# Patient Record
Sex: MALE | Race: WHITE | NOT HISPANIC OR LATINO | Employment: FULL TIME | URBAN - METROPOLITAN AREA
[De-identification: names, ages, dates, MRNs, and addresses within clinical notes are randomized per-mention and may not be internally consistent; named-entity substitution may affect disease eponyms.]

---

## 2020-05-09 ENCOUNTER — APPOINTMENT (OUTPATIENT)
Dept: GENERAL RADIOLOGY | Facility: HOSPITAL | Age: 45
End: 2020-05-09

## 2020-05-09 ENCOUNTER — HOSPITAL ENCOUNTER (EMERGENCY)
Facility: HOSPITAL | Age: 45
Discharge: HOME | End: 2020-05-10
Attending: EMERGENCY MEDICINE | Admitting: EMERGENCY MEDICINE

## 2020-05-09 DIAGNOSIS — I10 HYPERTENSION, UNSPECIFIED TYPE: Primary | ICD-10-CM

## 2020-05-09 PROCEDURE — 96374 THER/PROPH/DIAG INJ IV PUSH: CPT

## 2020-05-09 PROCEDURE — 84443 ASSAY THYROID STIM HORMONE: CPT | Performed by: EMERGENCY MEDICINE

## 2020-05-09 PROCEDURE — 80053 COMPREHEN METABOLIC PANEL: CPT | Performed by: EMERGENCY MEDICINE

## 2020-05-09 PROCEDURE — 93005 ELECTROCARDIOGRAM TRACING: CPT | Performed by: EMERGENCY MEDICINE

## 2020-05-09 PROCEDURE — 85025 COMPLETE CBC W/AUTO DIFF WBC: CPT | Performed by: EMERGENCY MEDICINE

## 2020-05-09 PROCEDURE — 99284 EMERGENCY DEPT VISIT MOD MDM: CPT

## 2020-05-09 PROCEDURE — 85007 BL SMEAR W/DIFF WBC COUNT: CPT | Performed by: EMERGENCY MEDICINE

## 2020-05-09 PROCEDURE — 71045 X-RAY EXAM CHEST 1 VIEW: CPT

## 2020-05-09 PROCEDURE — 84484 ASSAY OF TROPONIN QUANT: CPT | Performed by: EMERGENCY MEDICINE

## 2020-05-09 RX ORDER — LABETALOL HYDROCHLORIDE 5 MG/ML
20 INJECTION, SOLUTION INTRAVENOUS ONCE
Status: COMPLETED | OUTPATIENT
Start: 2020-05-09 | End: 2020-05-09

## 2020-05-09 RX ORDER — SODIUM CHLORIDE 0.9 % (FLUSH) 0.9 %
10 SYRINGE (ML) INJECTION AS NEEDED
Status: DISCONTINUED | OUTPATIENT
Start: 2020-05-09 | End: 2020-05-10 | Stop reason: HOSPADM

## 2020-05-09 RX ADMIN — LABETALOL 20 MG/4 ML (5 MG/ML) INTRAVENOUS SYRINGE 20 MG: at 23:48

## 2020-05-10 VITALS
HEIGHT: 67 IN | SYSTOLIC BLOOD PRESSURE: 173 MMHG | TEMPERATURE: 97.9 F | OXYGEN SATURATION: 97 % | RESPIRATION RATE: 18 BRPM | DIASTOLIC BLOOD PRESSURE: 93 MMHG | HEART RATE: 82 BPM | BODY MASS INDEX: 33.63 KG/M2 | WEIGHT: 214.29 LBS

## 2020-05-10 LAB
ALBUMIN SERPL-MCNC: 4.6 G/DL (ref 3.5–5.2)
ALBUMIN/GLOB SERPL: 1.5 G/DL
ALP SERPL-CCNC: 88 U/L (ref 39–117)
ALT SERPL W P-5'-P-CCNC: 35 U/L (ref 1–41)
ANION GAP SERPL CALCULATED.3IONS-SCNC: 14 MMOL/L (ref 5–15)
AST SERPL-CCNC: 23 U/L (ref 1–40)
BILIRUB SERPL-MCNC: 0.2 MG/DL (ref 0.2–1.2)
BUN BLD-MCNC: 14 MG/DL (ref 6–20)
BUN/CREAT SERPL: 15.4 (ref 7–25)
CALCIUM SPEC-SCNC: 9.7 MG/DL (ref 8.6–10.5)
CHLORIDE SERPL-SCNC: 102 MMOL/L (ref 98–107)
CO2 SERPL-SCNC: 24 MMOL/L (ref 22–29)
CREAT BLD-MCNC: 0.91 MG/DL (ref 0.76–1.27)
DEPRECATED RDW RBC AUTO: 37.6 FL (ref 37–54)
EOSINOPHIL # BLD MANUAL: 0.06 10*3/MM3 (ref 0–0.4)
EOSINOPHIL NFR BLD MANUAL: 1 % (ref 0.3–6.2)
ERYTHROCYTE [DISTWIDTH] IN BLOOD BY AUTOMATED COUNT: 12.6 % (ref 12.3–15.4)
GFR SERPL CREATININE-BSD FRML MDRD: 90 ML/MIN/1.73
GLOBULIN UR ELPH-MCNC: 3.1 GM/DL
GLUCOSE BLD-MCNC: 110 MG/DL (ref 65–99)
HCT VFR BLD AUTO: 46 % (ref 37.5–51)
HGB BLD-MCNC: 16.4 G/DL (ref 13–17.7)
LYMPHOCYTES # BLD MANUAL: 2.29 10*3/MM3 (ref 0.7–3.1)
LYMPHOCYTES NFR BLD MANUAL: 37 % (ref 19.6–45.3)
LYMPHOCYTES NFR BLD MANUAL: 5 % (ref 5–12)
MCH RBC QN AUTO: 30 PG (ref 26.6–33)
MCHC RBC AUTO-ENTMCNC: 35.6 G/DL (ref 31.5–35.7)
MCV RBC AUTO: 84.1 FL (ref 79–97)
MONOCYTES # BLD AUTO: 0.31 10*3/MM3 (ref 0.1–0.9)
NEUTROPHILS # BLD AUTO: 3.35 10*3/MM3 (ref 1.7–7)
NEUTROPHILS NFR BLD MANUAL: 48 % (ref 42.7–76)
NEUTS BAND NFR BLD MANUAL: 6 % (ref 0–5)
NEUTS VAC BLD QL SMEAR: ABNORMAL
PLAT MORPH BLD: NORMAL
PLATELET # BLD AUTO: 219 10*3/MM3 (ref 140–450)
PMV BLD AUTO: 8.1 FL (ref 6–12)
POTASSIUM BLD-SCNC: 3.5 MMOL/L (ref 3.5–5.2)
PROT SERPL-MCNC: 7.7 G/DL (ref 6–8.5)
RBC # BLD AUTO: 5.47 10*6/MM3 (ref 4.14–5.8)
RBC MORPH BLD: NORMAL
SCAN SLIDE: NORMAL
SODIUM BLD-SCNC: 140 MMOL/L (ref 136–145)
TOXIC GRANULATION: ABNORMAL
TROPONIN T SERPL-MCNC: <0.01 NG/ML (ref 0–0.03)
TSH SERPL DL<=0.05 MIU/L-ACNC: 2 UIU/ML (ref 0.27–4.2)
VARIANT LYMPHS NFR BLD MANUAL: 3 % (ref 0–5)
WBC NRBC COR # BLD: 6.2 10*3/MM3 (ref 3.4–10.8)

## 2020-05-10 RX ORDER — LABETALOL HYDROCHLORIDE 5 MG/ML
20 INJECTION, SOLUTION INTRAVENOUS ONCE
Status: DISCONTINUED | OUTPATIENT
Start: 2020-05-10 | End: 2020-05-10 | Stop reason: HOSPADM

## 2020-05-10 RX ORDER — LISINOPRIL 20 MG/1
40 TABLET ORAL DAILY
Qty: 30 TABLET | Refills: 0 | Status: SHIPPED | OUTPATIENT
Start: 2020-05-10 | End: 2022-09-21 | Stop reason: SDUPTHER

## 2020-05-10 NOTE — DISCHARGE INSTRUCTIONS
Follow-up with your family doctor on Tuesday as scheduled.  Take lisinopril 40 mg daily instead of 20.  Monitor blood pressure closely.  Return for chest pain shortness of breath severe headache visual change speech difficulty unable to talk walk or any other strokelike symptoms.  Return for tongue or throat swelling shortness of breath or any other new or worse problems or concerns.

## 2020-05-10 NOTE — ED PROVIDER NOTES
Subjective   Chief complaint high blood pressure    History of present illness 45-year-old male who comes to the hospital today because of elevated blood pressure he states he was diagnosed 2 weeks ago and started on lisinopril 20 mg but he states he continues to have elevated blood pressure denied it was running about 220/120 in that range.  He denies any complaints there is no headache chest pain or shortness of breath no visual change speech difficulty or paralysis states he gets occasionally lightheaded but otherwise he has no complaints.  Nothing seems to make it better or worse he states this is been ongoing for the last couple weeks that he is aware of when he was started on medications.  No recent illness flus or viruses or ill exposures or foreign travels and no injuries.  No urinary problems.          Review of Systems   Constitutional: Negative for chills and fever.   HENT: Negative for congestion and sinus pressure.    Eyes: Negative for photophobia and visual disturbance.   Respiratory: Negative for chest tightness and shortness of breath.    Cardiovascular: Negative for chest pain and leg swelling.   Gastrointestinal: Negative for abdominal pain and vomiting.   Endocrine: Negative for cold intolerance and heat intolerance.   Genitourinary: Negative for difficulty urinating and dysuria.   Musculoskeletal: Negative for arthralgias and back pain.   Skin: Negative for color change and pallor.   Neurological: Positive for light-headedness. Negative for dizziness, facial asymmetry, speech difficulty and headaches.   Psychiatric/Behavioral: Negative for agitation and behavioral problems.       History reviewed. No pertinent past medical history.  Hypertension  No Known Allergies    History reviewed. No pertinent surgical history.    No family history on file.    Social History     Socioeconomic History   • Marital status: Single     Spouse name: Not on file   • Number of children: Not on file   • Years of  education: Not on file   • Highest education level: Not on file       Lisinopril    Objective   Physical Exam  45-year-old male awake alert no acute distress.  HEENT extraocular muscles intact pupils equal reactive there is no nystagmus and disc are sharp without photophobia mouth is clear neck is supple no adenopathy no JVD no bruits lungs clear no retractions heart regular without murmur abdomen soft without tenderness no pulsatile masses extremities pulses are equal throughout upper and lower extremities no edema cords or Homans sign no evidence of DVT.  Patient's awake alert orientated x4 no facial asymmetry normal speech no drift in arms or legs normal finger-to-nose without focal weakness  Procedures           ED Course      Results for orders placed or performed during the hospital encounter of 05/09/20   Comprehensive Metabolic Panel   Result Value Ref Range    Glucose 110 (H) 65 - 99 mg/dL    BUN 14 6 - 20 mg/dL    Creatinine 0.91 0.76 - 1.27 mg/dL    Sodium 140 136 - 145 mmol/L    Potassium 3.5 3.5 - 5.2 mmol/L    Chloride 102 98 - 107 mmol/L    CO2 24.0 22.0 - 29.0 mmol/L    Calcium 9.7 8.6 - 10.5 mg/dL    Total Protein 7.7 6.0 - 8.5 g/dL    Albumin 4.60 3.50 - 5.20 g/dL    ALT (SGPT) 35 1 - 41 U/L    AST (SGOT) 23 1 - 40 U/L    Alkaline Phosphatase 88 39 - 117 U/L    Total Bilirubin 0.2 0.2 - 1.2 mg/dL    eGFR Non African Amer 90 >60 mL/min/1.73    Globulin 3.1 gm/dL    A/G Ratio 1.5 g/dL    BUN/Creatinine Ratio 15.4 7.0 - 25.0    Anion Gap 14.0 5.0 - 15.0 mmol/L   Troponin   Result Value Ref Range    Troponin T <0.010 0.000 - 0.030 ng/mL   TSH   Result Value Ref Range    TSH 2.000 0.270 - 4.200 uIU/mL   CBC Auto Differential   Result Value Ref Range    WBC 6.20 3.40 - 10.80 10*3/mm3    RBC 5.47 4.14 - 5.80 10*6/mm3    Hemoglobin 16.4 13.0 - 17.7 g/dL    Hematocrit 46.0 37.5 - 51.0 %    MCV 84.1 79.0 - 97.0 fL    MCH 30.0 26.6 - 33.0 pg    MCHC 35.6 31.5 - 35.7 g/dL    RDW 12.6 12.3 - 15.4 %    RDW-SD  37.6 37.0 - 54.0 fl    MPV 8.1 6.0 - 12.0 fL    Platelets 219 140 - 450 10*3/mm3   Scan Slide   Result Value Ref Range    Scan Slide     Manual Differential   Result Value Ref Range    Neutrophil % 48.0 42.7 - 76.0 %    Lymphocyte % 37.0 19.6 - 45.3 %    Monocyte % 5.0 5.0 - 12.0 %    Eosinophil % 1.0 0.3 - 6.2 %    Bands %  6.0 (H) 0.0 - 5.0 %    Atypical Lymphocyte % 3.0 0.0 - 5.0 %    Neutrophils Absolute 3.35 1.70 - 7.00 10*3/mm3    Lymphocytes Absolute 2.29 0.70 - 3.10 10*3/mm3    Monocytes Absolute 0.31 0.10 - 0.90 10*3/mm3    Eosinophils Absolute 0.06 0.00 - 0.40 10*3/mm3    RBC Morphology Normal Normal    Toxic Granulation Slight/1+ None Seen    Vacuolated Neutrophils Slight/1+ None Seen    Platelet Morphology Normal Normal     No radiology results for the last day  Medications   sodium chloride 0.9 % flush 10 mL (has no administration in time range)   labetalol (NORMODYNE,TRANDATE) injection 20 mg (has no administration in time range)   labetalol (NORMODYNE,TRANDATE) injection 20 mg (20 mg Intravenous Given 5/9/20 2348)          EKG my interpretation normal sinus rhythm rate of 96 some nonspecific repolarization abnormalities QTC of 425 no acute ST elevation nothing old to compare with abnormal EKG                                  MDM  Number of Diagnoses or Management Options  Hypertension, unspecified type:   Diagnosis management comments: Medical decision making.  Patient IV established placed on the monitor and had the above exam evaluation blood pressure was elevated about 212/118 he was given labetalol 20 mg IV CBC electrolytes troponin unremarkable.  TSH within normal limits.  Chest x-ray showed no acute findings.  Patient's blood pressure now is been 180/103 heart rate in the 80s he was given labetalol 20 mg IV.  Blood pressure did improve somewhat we did talk about this.  He is now on 20 mg lisinopril we will increase that but we made him aware of potential side effects including angioedema cough  renal abnormalities and he will need further blood work and follow-up on this he has an appointment on Tuesday.  We will monitor his blood pressure closely he has a machine at home.  I see no evidence of acute endorgan damage.  No evidence of a stroke or heart attack no evidence of acute ischemia.  He was made aware of the findings we talked about those findings and what to return for he will be discharged home for outpatient management      Final diagnoses:   Hypertension, unspecified type            Vishnu Armenta MD  05/10/20 0059

## 2020-05-29 ENCOUNTER — OFFICE VISIT (OUTPATIENT)
Dept: CARDIOLOGY | Facility: CLINIC | Age: 45
End: 2020-05-29

## 2020-05-29 VITALS
DIASTOLIC BLOOD PRESSURE: 95 MMHG | HEART RATE: 85 BPM | OXYGEN SATURATION: 97 % | HEIGHT: 67 IN | WEIGHT: 209 LBS | SYSTOLIC BLOOD PRESSURE: 182 MMHG | BODY MASS INDEX: 32.8 KG/M2

## 2020-05-29 DIAGNOSIS — R06.02 SHORTNESS OF BREATH: ICD-10-CM

## 2020-05-29 DIAGNOSIS — R94.31 ABNORMAL EKG: ICD-10-CM

## 2020-05-29 DIAGNOSIS — I10 ESSENTIAL HYPERTENSION: Primary | ICD-10-CM

## 2020-05-29 PROCEDURE — 99203 OFFICE O/P NEW LOW 30 MIN: CPT | Performed by: INTERNAL MEDICINE

## 2020-05-29 RX ORDER — AMLODIPINE BESYLATE 5 MG/1
5 TABLET ORAL DAILY
Qty: 90 TABLET | Refills: 3 | Status: SHIPPED | OUTPATIENT
Start: 2020-05-29

## 2020-05-29 NOTE — PROGRESS NOTES
"    Subjective:     Encounter Date:05/29/2020      Patient ID: Robby Lopez is a 45 y.o. male.    Chief Complaint:  History of Present Illness 42-year-old white male with history of hypertension hyperlipidemia and strong family history of coronary disease presents to my office for a new consultation.  Patient has been having some shortness of breath and dizziness.  No complaints of any chest pains.  No PND orthopnea.  No palpitations.  No syncope or swelling of the feet.  He noticed that he had very high blood pressure.  He started taking medicines but his blood pressure still high and he still having symptoms.  He stopped smoking recently.  He does not follow a good diet but he is unable to exercise very well.  BP (!) 182/95 (BP Location: Left arm, Patient Position: Sitting)   Pulse 85   Ht 170.2 cm (67\")   Wt 94.8 kg (209 lb)   SpO2 97%   BMI 32.73 kg/m²     The following portions of the patient's history were reviewed and updated as appropriate: allergies, current medications, past family history, past medical history, past social history, past surgical history and problem list.  History reviewed. No pertinent past medical history.  History reviewed. No pertinent surgical history.  Social History     Socioeconomic History   • Marital status: Single     Spouse name: Not on file   • Number of children: Not on file   • Years of education: Not on file   • Highest education level: Not on file   Tobacco Use   • Smoking status: Former Smoker   • Smokeless tobacco: Never Used   Substance and Sexual Activity   • Alcohol use: Not Currently     Family History   Problem Relation Age of Onset   • Heart attack Father    • Stroke Maternal Grandmother    • Heart attack Maternal Grandfather        Current Outpatient Medications:   •  lisinopril (PRINIVIL,ZESTRIL) 20 MG tablet, Take 2 tablets by mouth Daily., Disp: 30 tablet, Rfl: 0  •  Loratadine (CLARITIN PO), Take  by mouth., Disp: , Rfl:   No Known Allergies    Review of " Systems   Constitution: Negative for fever and malaise/fatigue.   HENT: Negative for ear pain and nosebleeds.    Eyes: Negative for blurred vision and double vision.   Cardiovascular: Negative for chest pain, dyspnea on exertion and palpitations.   Respiratory: Positive for shortness of breath. Negative for cough.    Skin: Negative for rash.   Musculoskeletal: Negative for joint pain.   Gastrointestinal: Negative for abdominal pain, nausea and vomiting.   Neurological: Positive for dizziness. Negative for focal weakness and headaches.   Psychiatric/Behavioral: Negative for depression. The patient is not nervous/anxious.    All other systems reviewed and are negative.             Objective:     Physical Exam   Constitutional: He appears well-developed and well-nourished.   HENT:   Head: Normocephalic and atraumatic.   Eyes: Pupils are equal, round, and reactive to light. Conjunctivae and EOM are normal. No scleral icterus.   Neck: Normal range of motion. Neck supple. No JVD present. Carotid bruit is not present.   Cardiovascular: Normal rate, regular rhythm, S1 normal, S2 normal, normal heart sounds and intact distal pulses. PMI is not displaced.   Pulmonary/Chest: Effort normal and breath sounds normal. He has no wheezes. He has no rales.   Abdominal: Soft. Bowel sounds are normal.   Musculoskeletal: Normal range of motion.   Neurological: He is alert. He has normal strength.   No focal deficits   Skin: Skin is warm and dry. No rash noted.   Psychiatric: He has a normal mood and affect.       Procedures    Lab Review:       Assessment:          Diagnosis Plan   1. Essential hypertension     2. Abnormal EKG     3. Shortness of breath            Plan:     Patient has history of shortness of breath and has an abnormal EKG.  He has risk factors with hypertension hyperlipidemia and family history of coronary disease.  Patient will have an echocardiogram for LV function valve abnormalities  Patient will also have a stress  Myoview study to rule out ischemia.  Further treatment based on stress test results  Patient's blood pressure very high and hence I will start him on amlodipine 5 mg once a day along with his lisinopril.

## 2020-06-18 ENCOUNTER — HOSPITAL ENCOUNTER (OUTPATIENT)
Dept: CARDIOLOGY | Facility: HOSPITAL | Age: 45
Discharge: HOME | End: 2020-06-18

## 2020-06-18 ENCOUNTER — HOSPITAL ENCOUNTER (OUTPATIENT)
Dept: CARDIOLOGY | Facility: HOSPITAL | Age: 45
Discharge: HOME | End: 2020-06-18
Admitting: INTERNAL MEDICINE

## 2020-06-18 VITALS
DIASTOLIC BLOOD PRESSURE: 87 MMHG | BODY MASS INDEX: 32.8 KG/M2 | SYSTOLIC BLOOD PRESSURE: 140 MMHG | HEIGHT: 67 IN | WEIGHT: 209 LBS

## 2020-06-18 DIAGNOSIS — I10 ESSENTIAL HYPERTENSION: ICD-10-CM

## 2020-06-18 DIAGNOSIS — R06.02 SHORTNESS OF BREATH: ICD-10-CM

## 2020-06-18 DIAGNOSIS — R94.31 ABNORMAL EKG: ICD-10-CM

## 2020-06-18 LAB
BH CV ECHO MEAS - ACS: 1.8 CM
BH CV ECHO MEAS - AO MAX PG (FULL): 3.1 MMHG
BH CV ECHO MEAS - AO MAX PG: 8.4 MMHG
BH CV ECHO MEAS - AO MEAN PG (FULL): 2.2 MMHG
BH CV ECHO MEAS - AO MEAN PG: 4.8 MMHG
BH CV ECHO MEAS - AO ROOT AREA: 9.3 CM^2
BH CV ECHO MEAS - AO ROOT DIAM: 3.4 CM
BH CV ECHO MEAS - AO V2 MAX: 144.8 CM/SEC
BH CV ECHO MEAS - AO V2 MEAN: 105.8 CM/SEC
BH CV ECHO MEAS - AO V2 VTI: 29.8 CM
BH CV ECHO MEAS - ASC AORTA: 3 CM
BH CV ECHO MEAS - AVA(I,A): 2 CM^2
BH CV ECHO MEAS - AVA(I,D): 2 CM^2
BH CV ECHO MEAS - AVA(V,A): 2.1 CM^2
BH CV ECHO MEAS - AVA(V,D): 2.1 CM^2
BH CV ECHO MEAS - EDV(CUBED): 149.1 ML
BH CV ECHO MEAS - EDV(MOD-SP2): 91.1 ML
BH CV ECHO MEAS - EDV(MOD-SP4): 100.2 ML
BH CV ECHO MEAS - EDV(TEICH): 135.5 ML
BH CV ECHO MEAS - EF(CUBED): 61.1 %
BH CV ECHO MEAS - EF(MOD-SP2): 64.9 %
BH CV ECHO MEAS - EF(MOD-SP4): 61 %
BH CV ECHO MEAS - EF(TEICH): 52.2 %
BH CV ECHO MEAS - ESV(CUBED): 58 ML
BH CV ECHO MEAS - ESV(MOD-SP2): 32 ML
BH CV ECHO MEAS - ESV(MOD-SP4): 39.1 ML
BH CV ECHO MEAS - ESV(TEICH): 64.7 ML
BH CV ECHO MEAS - FS: 27 %
BH CV ECHO MEAS - IVS/LVPW: 0.86
BH CV ECHO MEAS - IVSD: 0.87 CM
BH CV ECHO MEAS - LA DIMENSION: 3.2 CM
BH CV ECHO MEAS - LA/AO: 0.94
BH CV ECHO MEAS - LV MASS(C)D: 185.1 GRAMS
BH CV ECHO MEAS - LV MAX PG: 5.3 MMHG
BH CV ECHO MEAS - LV MEAN PG: 2.6 MMHG
BH CV ECHO MEAS - LV V1 MAX: 114.9 CM/SEC
BH CV ECHO MEAS - LV V1 MEAN: 76.5 CM/SEC
BH CV ECHO MEAS - LV V1 VTI: 21.9 CM
BH CV ECHO MEAS - LVIDD: 5.3 CM
BH CV ECHO MEAS - LVIDS: 3.9 CM
BH CV ECHO MEAS - LVOT AREA: 2.7 CM^2
BH CV ECHO MEAS - LVOT DIAM: 1.8 CM
BH CV ECHO MEAS - LVPWD: 1 CM
BH CV ECHO MEAS - MV A MAX VEL: 95.9 CM/SEC
BH CV ECHO MEAS - MV DEC SLOPE: 423.5 CM/SEC^2
BH CV ECHO MEAS - MV DEC TIME: 0.22 SEC
BH CV ECHO MEAS - MV E MAX VEL: 92.9 CM/SEC
BH CV ECHO MEAS - MV E/A: 0.97
BH CV ECHO MEAS - MV MAX PG: 4.4 MMHG
BH CV ECHO MEAS - MV MEAN PG: 2.8 MMHG
BH CV ECHO MEAS - MV V2 MAX: 105.4 CM/SEC
BH CV ECHO MEAS - MV V2 MEAN: 82.1 CM/SEC
BH CV ECHO MEAS - MV V2 VTI: 21.6 CM
BH CV ECHO MEAS - MVA(VTI): 2.7 CM^2
BH CV ECHO MEAS - PA ACC TIME: 0.16 SEC
BH CV ECHO MEAS - PA MAX PG (FULL): 0.41 MMHG
BH CV ECHO MEAS - PA MAX PG: 3.7 MMHG
BH CV ECHO MEAS - PA PR(ACCEL): 7.4 MMHG
BH CV ECHO MEAS - PA V2 MAX: 96.5 CM/SEC
BH CV ECHO MEAS - PULM DIAS VEL: 37.6 CM/SEC
BH CV ECHO MEAS - PULM S/D: 1.4
BH CV ECHO MEAS - PULM SYS VEL: 51.7 CM/SEC
BH CV ECHO MEAS - RV MAX PG: 3.3 MMHG
BH CV ECHO MEAS - RV MEAN PG: 2.2 MMHG
BH CV ECHO MEAS - RV V1 MAX: 91 CM/SEC
BH CV ECHO MEAS - RV V1 MEAN: 71.4 CM/SEC
BH CV ECHO MEAS - RV V1 VTI: 17.8 CM
BH CV ECHO MEAS - RVDD: 2.9 CM
BH CV ECHO MEAS - SV(AO): 276.9 ML
BH CV ECHO MEAS - SV(CUBED): 91.1 ML
BH CV ECHO MEAS - SV(LVOT): 58.7 ML
BH CV ECHO MEAS - SV(MOD-SP2): 59.2 ML
BH CV ECHO MEAS - SV(MOD-SP4): 61.2 ML
BH CV ECHO MEAS - SV(TEICH): 70.7 ML
BH CV STRESS COMMENTS STAGE 1: NORMAL
BH CV STRESS DOSE REGADENOSON STAGE 1: 0.4
BH CV STRESS DURATION MIN STAGE 1: 0
BH CV STRESS DURATION SEC STAGE 1: 10
BH CV STRESS PROTOCOL 1: NORMAL
BH CV STRESS RECOVERY BP: NORMAL MMHG
BH CV STRESS RECOVERY HR: 93 BPM
BH CV STRESS STAGE 1: 1
LV EF NUC BP: 48 %
MAXIMAL PREDICTED HEART RATE: 174 BPM
MAXIMAL PREDICTED HEART RATE: 174 BPM
STRESS BASELINE BP: NORMAL MMHG
STRESS BASELINE HR: 70 BPM
STRESS TARGET HR: 148 BPM
STRESS TARGET HR: 148 BPM

## 2020-06-18 PROCEDURE — 93018 CV STRESS TEST I&R ONLY: CPT | Performed by: INTERNAL MEDICINE

## 2020-06-18 PROCEDURE — 0 TECHNETIUM SESTAMIBI: Performed by: INTERNAL MEDICINE

## 2020-06-18 PROCEDURE — 78452 HT MUSCLE IMAGE SPECT MULT: CPT

## 2020-06-18 PROCEDURE — 93306 TTE W/DOPPLER COMPLETE: CPT

## 2020-06-18 PROCEDURE — A9500 TC99M SESTAMIBI: HCPCS | Performed by: INTERNAL MEDICINE

## 2020-06-18 PROCEDURE — 93017 CV STRESS TEST TRACING ONLY: CPT

## 2020-06-18 PROCEDURE — 93016 CV STRESS TEST SUPVJ ONLY: CPT | Performed by: INTERNAL MEDICINE

## 2020-06-18 PROCEDURE — 93306 TTE W/DOPPLER COMPLETE: CPT | Performed by: INTERNAL MEDICINE

## 2020-06-18 PROCEDURE — 25010000002 REGADENOSON 0.4 MG/5ML SOLUTION: Performed by: INTERNAL MEDICINE

## 2020-06-18 PROCEDURE — 78452 HT MUSCLE IMAGE SPECT MULT: CPT | Performed by: INTERNAL MEDICINE

## 2020-06-18 RX ADMIN — TECHNETIUM TC 99M SESTAMIBI 1 DOSE: 1 INJECTION INTRAVENOUS at 13:00

## 2020-06-18 RX ADMIN — REGADENOSON 0.4 MG: 0.08 INJECTION, SOLUTION INTRAVENOUS at 15:00

## 2022-09-14 ENCOUNTER — APPOINTMENT (OUTPATIENT)
Dept: GENERAL RADIOLOGY | Facility: HOSPITAL | Age: 47
End: 2022-09-14

## 2022-09-14 ENCOUNTER — HOSPITAL ENCOUNTER (EMERGENCY)
Facility: HOSPITAL | Age: 47
Discharge: HOME | End: 2022-09-14
Attending: EMERGENCY MEDICINE | Admitting: EMERGENCY MEDICINE

## 2022-09-14 VITALS
WEIGHT: 231.26 LBS | HEIGHT: 67 IN | OXYGEN SATURATION: 100 % | DIASTOLIC BLOOD PRESSURE: 92 MMHG | RESPIRATION RATE: 24 BRPM | TEMPERATURE: 98 F | BODY MASS INDEX: 36.3 KG/M2 | HEART RATE: 77 BPM | SYSTOLIC BLOOD PRESSURE: 150 MMHG

## 2022-09-14 DIAGNOSIS — R07.9 CHEST PAIN, UNSPECIFIED TYPE: Primary | ICD-10-CM

## 2022-09-14 LAB
ANION GAP SERPL CALCULATED.3IONS-SCNC: 8 MMOL/L (ref 5–15)
BASOPHILS # BLD AUTO: 0.1 10*3/MM3 (ref 0–0.2)
BASOPHILS NFR BLD AUTO: 1.2 % (ref 0–1.5)
BUN SERPL-MCNC: 16 MG/DL (ref 6–20)
BUN/CREAT SERPL: 18.2 (ref 7–25)
CALCIUM SPEC-SCNC: 8.4 MG/DL (ref 8.6–10.5)
CHLORIDE SERPL-SCNC: 104 MMOL/L (ref 98–107)
CO2 SERPL-SCNC: 27 MMOL/L (ref 22–29)
CREAT SERPL-MCNC: 0.88 MG/DL (ref 0.76–1.27)
DEPRECATED RDW RBC AUTO: 41.6 FL (ref 37–54)
EGFRCR SERPLBLD CKD-EPI 2021: 106.1 ML/MIN/1.73
EOSINOPHIL # BLD AUTO: 0.2 10*3/MM3 (ref 0–0.4)
EOSINOPHIL NFR BLD AUTO: 3.1 % (ref 0.3–6.2)
ERYTHROCYTE [DISTWIDTH] IN BLOOD BY AUTOMATED COUNT: 13.4 % (ref 12.3–15.4)
GLUCOSE SERPL-MCNC: 86 MG/DL (ref 65–99)
HCT VFR BLD AUTO: 45.1 % (ref 37.5–51)
HGB BLD-MCNC: 15.3 G/DL (ref 13–17.7)
LYMPHOCYTES # BLD AUTO: 1.6 10*3/MM3 (ref 0.7–3.1)
LYMPHOCYTES NFR BLD AUTO: 22.4 % (ref 19.6–45.3)
MCH RBC QN AUTO: 30 PG (ref 26.6–33)
MCHC RBC AUTO-ENTMCNC: 33.8 G/DL (ref 31.5–35.7)
MCV RBC AUTO: 88.7 FL (ref 79–97)
MONOCYTES # BLD AUTO: 0.7 10*3/MM3 (ref 0.1–0.9)
MONOCYTES NFR BLD AUTO: 9.6 % (ref 5–12)
NEUTROPHILS NFR BLD AUTO: 4.5 10*3/MM3 (ref 1.7–7)
NEUTROPHILS NFR BLD AUTO: 63.7 % (ref 42.7–76)
NRBC BLD AUTO-RTO: 0 /100 WBC (ref 0–0.2)
PLATELET # BLD AUTO: 205 10*3/MM3 (ref 140–450)
PMV BLD AUTO: 9 FL (ref 6–12)
POTASSIUM SERPL-SCNC: 3.4 MMOL/L (ref 3.5–5.2)
RBC # BLD AUTO: 5.09 10*6/MM3 (ref 4.14–5.8)
SODIUM SERPL-SCNC: 139 MMOL/L (ref 136–145)
TROPONIN T SERPL-MCNC: <0.01 NG/ML (ref 0–0.03)
WBC NRBC COR # BLD: 7.1 10*3/MM3 (ref 3.4–10.8)

## 2022-09-14 PROCEDURE — 84484 ASSAY OF TROPONIN QUANT: CPT | Performed by: EMERGENCY MEDICINE

## 2022-09-14 PROCEDURE — 99284 EMERGENCY DEPT VISIT MOD MDM: CPT

## 2022-09-14 PROCEDURE — 71045 X-RAY EXAM CHEST 1 VIEW: CPT

## 2022-09-14 PROCEDURE — 85025 COMPLETE CBC W/AUTO DIFF WBC: CPT | Performed by: EMERGENCY MEDICINE

## 2022-09-14 PROCEDURE — 80048 BASIC METABOLIC PNL TOTAL CA: CPT | Performed by: EMERGENCY MEDICINE

## 2022-09-14 PROCEDURE — 93005 ELECTROCARDIOGRAM TRACING: CPT

## 2022-09-14 RX ORDER — SODIUM CHLORIDE 0.9 % (FLUSH) 0.9 %
10 SYRINGE (ML) INJECTION AS NEEDED
Status: DISCONTINUED | OUTPATIENT
Start: 2022-09-14 | End: 2022-09-14 | Stop reason: HOSPADM

## 2022-09-14 NOTE — DISCHARGE INSTRUCTIONS
Monitor blood pressure closely.  Follow-up with a cardiologist as listed above call tomorrow.  Return for increasing pain neck arm jaw pain shortness of breath dizziness passing out change her mind about staying or any other new or worsening problems or concerns.

## 2022-09-14 NOTE — ED PROVIDER NOTES
Subjective   History of Present Illness  Chief complaint tightness in chest    History of present illness a 47-year-old male who has a history of hypertension who states he went to work about 5:00 this morning he started to have some tightness in his chest and blood pressure was somewhat elevated this is persisted he describes it as mild to moderate there is no neck arm jaw pain dizziness shortness of breath or sweating.  Is been pretty much constant but has improved since getting here.  Blood pressure was slightly elevated as well.  He denies any recent long car ride plane or immobilization or foreign travels antibiotic use or recent hospitalization.  Nothing makes it better or worse it does not get worse with exertion to get better with rest.  No other complaints or associated symptoms at this time        Review of Systems   Constitutional: Negative for chills and fever.   HENT: Negative for congestion and sinus pressure.    Eyes: Negative for photophobia and visual disturbance.   Respiratory: Negative for chest tightness and shortness of breath.    Cardiovascular: Positive for chest pain. Negative for palpitations.   Gastrointestinal: Negative for abdominal pain and vomiting.   Endocrine: Negative for cold intolerance and heat intolerance.   Genitourinary: Negative for difficulty urinating and dysuria.   Musculoskeletal: Negative for back pain and neck pain.   Skin: Negative for color change and rash.   Neurological: Negative for dizziness and light-headedness.   Psychiatric/Behavioral: Negative for agitation and confusion.       No past medical history on file.  Hypertension  No Known Allergies    No past surgical history on file.    Family History   Problem Relation Age of Onset   • Heart attack Father    • Stroke Maternal Grandmother    • Heart attack Maternal Grandfather        Social History     Socioeconomic History   • Marital status: Single   Tobacco Use   • Smoking status: Former Smoker   • Smokeless  tobacco: Never Used   Substance and Sexual Activity   • Alcohol use: Not Currently     Prior to Admission medications    Medication Sig Start Date End Date Taking? Authorizing Provider   amLODIPine (NORVASC) 5 MG tablet Take 1 tablet by mouth Daily. 5/29/20   Jose Verde MD   lisinopril (PRINIVIL,ZESTRIL) 20 MG tablet Take 2 tablets by mouth Daily. 5/10/20   Vishnu Armenta MD   Loratadine (CLARITIN PO) Take  by mouth.    Provider, MD Vishal           Objective   Physical Exam  Constitutional is a 47-year-old male awake alert no distress temperature 98.3 blood pressure 131/85 heart rate 82 respirations 18.  Sats 98% on room air.  HEENT extraocular muscles intact pupils equal round reactive there is no photophobia.  Neck supple no adenopathy no JVD no bruits no meningeal signs.  Lungs clear no retractions.  Heart regular without murmur.  Abdomen soft nontender good bowel sounds no peritoneal findings or pulsatile masses.  Extremities pulses are equal throughout upper and lower extremities no edema cords or Homans' sign no evidence of DVT.  Skin is warm and dry without rashes or cellulitic changes.  Neurologic awake alert follows commands motor strength normal without focal weakness  Procedures           ED Course      Results for orders placed or performed during the hospital encounter of 09/14/22   Basic Metabolic Panel    Specimen: Blood   Result Value Ref Range    Glucose 86 65 - 99 mg/dL    BUN 16 6 - 20 mg/dL    Creatinine 0.88 0.76 - 1.27 mg/dL    Sodium 139 136 - 145 mmol/L    Potassium 3.4 (L) 3.5 - 5.2 mmol/L    Chloride 104 98 - 107 mmol/L    CO2 27.0 22.0 - 29.0 mmol/L    Calcium 8.4 (L) 8.6 - 10.5 mg/dL    BUN/Creatinine Ratio 18.2 7.0 - 25.0    Anion Gap 8.0 5.0 - 15.0 mmol/L    eGFR 106.1 >60.0 mL/min/1.73   Troponin    Specimen: Blood   Result Value Ref Range    Troponin T <0.010 0.000 - 0.030 ng/mL   CBC Auto Differential    Specimen: Blood   Result Value Ref Range    WBC 7.10 3.40 - 10.80  10*3/mm3    RBC 5.09 4.14 - 5.80 10*6/mm3    Hemoglobin 15.3 13.0 - 17.7 g/dL    Hematocrit 45.1 37.5 - 51.0 %    MCV 88.7 79.0 - 97.0 fL    MCH 30.0 26.6 - 33.0 pg    MCHC 33.8 31.5 - 35.7 g/dL    RDW 13.4 12.3 - 15.4 %    RDW-SD 41.6 37.0 - 54.0 fl    MPV 9.0 6.0 - 12.0 fL    Platelets 205 140 - 450 10*3/mm3    Neutrophil % 63.7 42.7 - 76.0 %    Lymphocyte % 22.4 19.6 - 45.3 %    Monocyte % 9.6 5.0 - 12.0 %    Eosinophil % 3.1 0.3 - 6.2 %    Basophil % 1.2 0.0 - 1.5 %    Neutrophils, Absolute 4.50 1.70 - 7.00 10*3/mm3    Lymphocytes, Absolute 1.60 0.70 - 3.10 10*3/mm3    Monocytes, Absolute 0.70 0.10 - 0.90 10*3/mm3    Eosinophils, Absolute 0.20 0.00 - 0.40 10*3/mm3    Basophils, Absolute 0.10 0.00 - 0.20 10*3/mm3    nRBC 0.0 0.0 - 0.2 /100 WBC   ECG 12 Lead   Result Value Ref Range    QT Interval 354 ms     XR Chest 1 View    Result Date: 9/14/2022  IMPRESSION : No acute process.[  Electronically Signed By-Everette Vásquez On:9/14/2022 2:15 PM This report was finalized on [id number] by  Everette Vásquez, .    Medications   sodium chloride 0.9 % flush 10 mL (has no administration in time range)          EKG my interpretation normal sinus rhythm rate of 83 normal axis hypertrophy QTC of 417 normal EKG                                  MDM  Number of Diagnoses or Management Options  Chest pain, unspecified type: new and requires workup  Diagnosis management comments: Medical decision making.  IV established placed on a cardiac monitor showed a sinus rhythm on my review EKG obtained reviewed by me showing normal sinus rhythm without acute findings.  Labs obtained reviewed by me CBC electrolytes troponin within normal limits other than potassium 3.4.  Chest x-ray obtained reviewed by me showed no acute disease as well as radiology.  The patient has no evidence of acute myocardial infarction no evidence of DVT or pulmonary embolism or acute aortic dissection.  I do not see any evidence of an acute event currently  but we did discuss his findings he has a heart score of 2 we discussed observation for stress testing and cardiac work-up review of his records show he had a stress test 2 years ago which is unremarkable.  Patient states he feels fine currently he would rather go home we talked about potential risk and complications he voices understanding.  He is aware of this and he will follow-up with cardiology stable otherwise unremarkable ER course improved       Amount and/or Complexity of Data Reviewed  Clinical lab tests: reviewed  Tests in the radiology section of CPT®: reviewed  Tests in the medicine section of CPT®: reviewed    Risk of Complications, Morbidity, and/or Mortality  Presenting problems: high  Diagnostic procedures: high  Management options: high    Patient Progress  Patient progress: stable      Final diagnoses:   Chest pain, unspecified type       ED Disposition  ED Disposition     ED Disposition   Discharge    Condition   Stable    Comment   --             Gigi Espinal MD  [address] [facility] [place] [address]  [phone]    In 1 day      Jose Verde MD  [address] [facility] [place] [address]  [phone]    In 1 day           Medication List      No changes were made to your prescriptions during this visit.          Vishnu Armenta MD  09/14/22 1630

## 2022-09-20 LAB — QT INTERVAL: 354 MS

## 2022-09-21 ENCOUNTER — OFFICE VISIT (OUTPATIENT)
Dept: CARDIOLOGY | Facility: CLINIC | Age: 47
End: 2022-09-21

## 2022-09-21 VITALS
HEART RATE: 83 BPM | BODY MASS INDEX: 35.31 KG/M2 | WEIGHT: 225 LBS | OXYGEN SATURATION: 97 % | SYSTOLIC BLOOD PRESSURE: 118 MMHG | HEIGHT: 67 IN | DIASTOLIC BLOOD PRESSURE: 73 MMHG

## 2022-09-21 DIAGNOSIS — R07.2 PRECORDIAL PAIN: Primary | ICD-10-CM

## 2022-09-21 DIAGNOSIS — E78.00 PURE HYPERCHOLESTEROLEMIA: ICD-10-CM

## 2022-09-21 DIAGNOSIS — I10 PRIMARY HYPERTENSION: ICD-10-CM

## 2022-09-21 PROCEDURE — 99213 OFFICE O/P EST LOW 20 MIN: CPT | Performed by: INTERNAL MEDICINE

## 2022-09-21 RX ORDER — ROSUVASTATIN CALCIUM 10 MG/1
10 TABLET, COATED ORAL DAILY
COMMUNITY
Start: 2022-09-16

## 2022-09-21 RX ORDER — LISINOPRIL AND HYDROCHLOROTHIAZIDE 25; 20 MG/1; MG/1
1 TABLET ORAL DAILY
COMMUNITY
Start: 2022-08-31

## 2022-09-21 NOTE — PROGRESS NOTES
"    Subjective:     Encounter Date:09/21/2022      Patient ID: Robby Lopez is a 47 y.o. male.    Chief Complaint:  History of Present Illness 7-year-old white male with hypertension hyperlipidemia presents to my office for a follow-up.  Patient had an episode of chest pain when he went to the ER and was ruled out for MI.  He had normal EKG.  He had a stress test and echocardiogram in the last 2 years which were within normal limits.  He is not having any more episodes of chest pain.  Patient chest pain was mostly atypical on the left side without any radiation.  He does not smoke but is quite active.    The following portions of the patient's history were reviewed and updated as appropriate: allergies, current medications, past family history, past medical history, past social history, past surgical history and problem list.  Past Medical History:   Diagnosis Date   • Hyperlipidemia    • Hypertension      Past Surgical History:   Procedure Laterality Date   • EAR TUBES     • FACIAL FRACTURE SURGERY  2019     /73   Pulse 83   Ht 170.2 cm (67\")   Wt 102 kg (225 lb)   SpO2 97%   BMI 35.24 kg/m²   Family History   Problem Relation Age of Onset   • No Known Problems Mother    • Heart attack Father    • No Known Problems Sister    • No Known Problems Brother    • No Known Problems Maternal Aunt    • No Known Problems Maternal Uncle    • No Known Problems Paternal Aunt    • No Known Problems Paternal Uncle    • Stroke Maternal Grandmother    • Heart attack Maternal Grandfather    • No Known Problems Paternal Grandmother    • No Known Problems Paternal Grandfather    • No Known Problems Other    • Anemia Neg Hx    • Arrhythmia Neg Hx    • Asthma Neg Hx    • Clotting disorder Neg Hx    • Fainting Neg Hx    • Heart disease Neg Hx    • Heart failure Neg Hx    • Hyperlipidemia Neg Hx    • Hypertension Neg Hx        Current Outpatient Medications:   •  amLODIPine (NORVASC) 5 MG tablet, Take 1 tablet by mouth Daily., " Disp: 90 tablet, Rfl: 3  •  lisinopril-hydrochlorothiazide (PRINZIDE,ZESTORETIC) 20-25 MG per tablet, Take 1 tablet by mouth Daily., Disp: , Rfl:   •  Loratadine (CLARITIN PO), Take  by mouth., Disp: , Rfl:   •  rosuvastatin (CRESTOR) 10 MG tablet, Take 10 mg by mouth Daily., Disp: , Rfl:   No Known Allergies  Social History     Socioeconomic History   • Marital status: Single   Tobacco Use   • Smoking status: Former Smoker   • Smokeless tobacco: Never Used   Vaping Use   • Vaping Use: Never used   Substance and Sexual Activity   • Alcohol use: Yes     Comment: OCC.   • Drug use: Never   • Sexual activity: Defer     Review of Systems   Constitutional: Negative for fever and malaise/fatigue.   Cardiovascular: Negative for chest pain, dyspnea on exertion and palpitations.   Respiratory: Negative for cough and shortness of breath.    Skin: Negative for rash.   Gastrointestinal: Negative for abdominal pain, nausea and vomiting.   Neurological: Negative for focal weakness and headaches.   All other systems reviewed and are negative.             Objective:     Constitutional:       Appearance: Well-developed.   Eyes:      General: No scleral icterus.     Conjunctiva/sclera: Conjunctivae normal.   HENT:      Head: Normocephalic and atraumatic.   Neck:      Vascular: No carotid bruit or JVD.   Pulmonary:      Effort: Pulmonary effort is normal.      Breath sounds: Normal breath sounds. No wheezing. No rales.   Cardiovascular:      Normal rate. Regular rhythm.   Pulses:     Intact distal pulses.   Abdominal:      General: Bowel sounds are normal.      Palpations: Abdomen is soft.   Musculoskeletal:      Cervical back: Normal range of motion and neck supple. Skin:     General: Skin is warm and dry.      Findings: No rash.   Neurological:      Mental Status: Alert.       Procedures    Lab Review:         MDM  1.  Chest pain  Patient has atypical chest pain but had an echo and a stress test recently in the last 2 years which  were normal and does not need any further cardiac work-up but needs work-up for other causes  2.  Hypertension  Patient blood pressure currently stable on lisinopril and amlodipine  3.  Hyperlipidemia  Patient is on Crestor and the lipid levels are followed by the primary care doctor      Patient's previous medical records, labs, and EKG were reviewed and discussed with the patient at today's visit.

## 2024-06-26 ENCOUNTER — PATIENT ROUNDING (BHMG ONLY) (OUTPATIENT)
Dept: URGENT CARE | Facility: CLINIC | Age: 49
End: 2024-06-26
Payer: COMMERCIAL

## 2024-06-26 NOTE — ED NOTES
Thank you for letting us care for you in your recent visit to our urgent care center. We would love to hear about your experience with us. Was this the first time you have visited our location?    We’re always looking for ways to make our patients’ experiences even better. Do you have any recommendations on ways we may improve?     I appreciate you taking the time to respond. Please be on the lookout for a survey about your recent visit from [employer] via text or email. We would greatly appreciate if you could fill that out and turn it back in. We want your voice to be heard and we value your feedback.   Thank you for choosing [facility] for your healthcare needs.

## 2024-06-26 NOTE — ED NOTES
Thank you for letting us care for you in your recent visit to our urgent care center. We would love to hear about your experience with us. Was this the first time you have visited our location?    We’re always looking for ways to make our patients’ experiences even better. Do you have any recommendations on ways we may improve?     I appreciate you taking the time to respond. Please be on the lookout for a survey about your recent visit from [employer] via text or email. We would greatly appreciate if you could fill that out and turn it back in. We want your voice to be heard and we value your feedback.   Thank you for choosing [facility] for your healthcare needs.

## 2024-07-11 ENCOUNTER — HOSPITAL ENCOUNTER (EMERGENCY)
Facility: HOSPITAL | Age: 49
Discharge: HOME | End: 2024-07-11
Payer: COMMERCIAL

## 2024-07-11 VITALS
HEIGHT: 67 IN | SYSTOLIC BLOOD PRESSURE: 142 MMHG | BODY MASS INDEX: 37.23 KG/M2 | DIASTOLIC BLOOD PRESSURE: 81 MMHG | OXYGEN SATURATION: 96 % | TEMPERATURE: 98.3 F | WEIGHT: 237.22 LBS | HEART RATE: 81 BPM | RESPIRATION RATE: 18 BRPM

## 2024-07-11 DIAGNOSIS — R53.83 TIREDNESS: ICD-10-CM

## 2024-07-11 DIAGNOSIS — G47.9 SLEEP DISTURBANCES: Primary | ICD-10-CM

## 2024-07-11 LAB
ANION GAP SERPL CALCULATED.3IONS-SCNC: 10 MMOL/L (ref 5–15)
BASOPHILS # BLD AUTO: 0.03 10*3/MM3 (ref 0–0.2)
BASOPHILS NFR BLD AUTO: 0.4 % (ref 0–1.5)
BILIRUB UR QL STRIP: NEGATIVE
BUN SERPL-MCNC: 16 MG/DL (ref 6–20)
BUN/CREAT SERPL: 16.2 (ref 7–25)
CALCIUM SPEC-SCNC: 9.2 MG/DL (ref 8.6–10.5)
CHLORIDE SERPL-SCNC: 103 MMOL/L (ref 98–107)
CLARITY UR: CLEAR
CO2 SERPL-SCNC: 27 MMOL/L (ref 22–29)
COLOR UR: YELLOW
CREAT SERPL-MCNC: 0.99 MG/DL (ref 0.76–1.27)
DEPRECATED RDW RBC AUTO: 38.7 FL (ref 37–54)
EGFRCR SERPLBLD CKD-EPI 2021: 92.8 ML/MIN/1.73
EOSINOPHIL # BLD AUTO: 0.22 10*3/MM3 (ref 0–0.4)
EOSINOPHIL NFR BLD AUTO: 3 % (ref 0.3–6.2)
ERYTHROCYTE [DISTWIDTH] IN BLOOD BY AUTOMATED COUNT: 12.4 % (ref 12.3–15.4)
GLUCOSE SERPL-MCNC: 102 MG/DL (ref 65–99)
GLUCOSE UR STRIP-MCNC: NEGATIVE MG/DL
HCT VFR BLD AUTO: 41.6 % (ref 37.5–51)
HGB BLD-MCNC: 14.3 G/DL (ref 13–17.7)
HGB UR QL STRIP.AUTO: NEGATIVE
IMM GRANULOCYTES # BLD AUTO: 0.03 10*3/MM3 (ref 0–0.05)
IMM GRANULOCYTES NFR BLD AUTO: 0.4 % (ref 0–0.5)
KETONES UR QL STRIP: ABNORMAL
LEUKOCYTE ESTERASE UR QL STRIP.AUTO: NEGATIVE
LYMPHOCYTES # BLD AUTO: 2.31 10*3/MM3 (ref 0.7–3.1)
LYMPHOCYTES NFR BLD AUTO: 32 % (ref 19.6–45.3)
MCH RBC QN AUTO: 29.5 PG (ref 26.6–33)
MCHC RBC AUTO-ENTMCNC: 34.4 G/DL (ref 31.5–35.7)
MCV RBC AUTO: 85.8 FL (ref 79–97)
MONOCYTES # BLD AUTO: 0.64 10*3/MM3 (ref 0.1–0.9)
MONOCYTES NFR BLD AUTO: 8.9 % (ref 5–12)
NEUTROPHILS NFR BLD AUTO: 4 10*3/MM3 (ref 1.7–7)
NEUTROPHILS NFR BLD AUTO: 55.3 % (ref 42.7–76)
NITRITE UR QL STRIP: NEGATIVE
NRBC BLD AUTO-RTO: 0 /100 WBC (ref 0–0.2)
PH UR STRIP.AUTO: 7 [PH] (ref 5–8)
PLATELET # BLD AUTO: 182 10*3/MM3 (ref 140–450)
PMV BLD AUTO: 10.6 FL (ref 6–12)
POTASSIUM SERPL-SCNC: 3.7 MMOL/L (ref 3.5–5.2)
PROT UR QL STRIP: NEGATIVE
RBC # BLD AUTO: 4.85 10*6/MM3 (ref 4.14–5.8)
SODIUM SERPL-SCNC: 140 MMOL/L (ref 136–145)
SP GR UR STRIP: 1.02 (ref 1–1.03)
TSH SERPL DL<=0.05 MIU/L-ACNC: 0.97 UIU/ML (ref 0.27–4.2)
UROBILINOGEN UR QL STRIP: ABNORMAL
WBC NRBC COR # BLD AUTO: 7.23 10*3/MM3 (ref 3.4–10.8)

## 2024-07-11 PROCEDURE — 81003 URINALYSIS AUTO W/O SCOPE: CPT | Performed by: NURSE PRACTITIONER

## 2024-07-11 PROCEDURE — 36415 COLL VENOUS BLD VENIPUNCTURE: CPT

## 2024-07-11 PROCEDURE — 84443 ASSAY THYROID STIM HORMONE: CPT | Performed by: NURSE PRACTITIONER

## 2024-07-11 PROCEDURE — 85025 COMPLETE CBC W/AUTO DIFF WBC: CPT | Performed by: NURSE PRACTITIONER

## 2024-07-11 PROCEDURE — 99283 EMERGENCY DEPT VISIT LOW MDM: CPT

## 2024-07-11 PROCEDURE — 80048 BASIC METABOLIC PNL TOTAL CA: CPT | Performed by: NURSE PRACTITIONER

## 2024-07-11 NOTE — ED PROVIDER NOTES
Subjective   Provider in Triage Note  49-year-old male presents ambulatory by private vehicle with complaints of fatigue x 1 month.  States he had some symmetrical bilateral lower extremity swelling that was better after taking prednisone on June 25.    Chart review: 6/25/2024 [facility] note: fatigue, hand radiculopathy; negative EKG, cxr, cervical xr    Due to significant overcrowding in the emergency department patient was initially seen and evaluated in triage.  Provider in triage recommended patient placement in the treatment area to initiate therapy and movement to an ER bed as soon as possible.   Orders placed; medications will be deferred to main provider per protocol.        History of Present Illness  I have read the above HPI written by previous provider in triage and attest that is accurate for patient's HPI.      Review of Systems   Constitutional:  Positive for fatigue. Negative for fever.   HENT:  Negative for congestion and rhinorrhea.    Respiratory:  Negative for chest tightness.    Gastrointestinal:  Negative for abdominal pain.   Genitourinary:  Negative for flank pain.   Musculoskeletal:  Positive for myalgias.   Neurological:  Negative for syncope and headaches.   Psychiatric/Behavioral:  Positive for sleep disturbance. The patient is not nervous/anxious.    All other systems reviewed and are negative.      Past Medical History:   Diagnosis Date    Hyperlipidemia     Hypertension        No Known Allergies    Past Surgical History:   Procedure Laterality Date    EAR TUBES      FACIAL FRACTURE SURGERY  2019    ORBITAL FRACTURE OPEN REDUCTION INTERNAL FIXATION      TONSILLECTOMY         Family History   Problem Relation Age of Onset    No Known Problems Mother     Heart attack Father     No Known Problems Sister     No Known Problems Brother     No Known Problems Maternal Aunt     No Known Problems Maternal Uncle     No Known Problems Paternal Aunt     No Known Problems Paternal Uncle     Stroke  "Maternal Grandmother     Heart attack Maternal Grandfather     No Known Problems Paternal Grandmother     No Known Problems Paternal Grandfather     No Known Problems Other     Anemia Neg Hx     Arrhythmia Neg Hx     Asthma Neg Hx     Clotting disorder Neg Hx     Fainting Neg Hx     Heart disease Neg Hx     Heart failure Neg Hx     Hyperlipidemia Neg Hx     Hypertension Neg Hx        Social History     Socioeconomic History    Marital status: Single   Tobacco Use    Smoking status: Former     Passive exposure: Past    Smokeless tobacco: Never   Vaping Use    Vaping status: Never Used   Substance and Sexual Activity    Alcohol use: Yes     Comment: OCC.    Drug use: Never    Sexual activity: Not Currently           Objective   Physical Exam  Vitals and nursing note reviewed.   Constitutional:       General: He is not in acute distress.     Appearance: Normal appearance. He is obese. He is not ill-appearing.   HENT:      Head: Normocephalic and atraumatic.   Eyes:      Pupils: Pupils are equal, round, and reactive to light.   Cardiovascular:      Rate and Rhythm: Normal rate and regular rhythm.      Heart sounds: Normal heart sounds. No murmur heard.  Pulmonary:      Effort: No respiratory distress.      Breath sounds: Normal breath sounds.   Abdominal:      General: Bowel sounds are normal.      Tenderness: There is no abdominal tenderness.   Musculoskeletal:         General: Normal range of motion.   Skin:     Capillary Refill: Capillary refill takes less than 2 seconds.   Neurological:      Mental Status: He is alert and oriented to person, place, and time.         Procedures           ED Course      /90   Pulse 88   Temp 98.3 °F (36.8 °C) (Oral)   Resp 18   Ht 170.2 cm (67\")   Wt 108 kg (237 lb 3.4 oz)   SpO2 95%   BMI 37.15 kg/m²   Labs Reviewed   BASIC METABOLIC PANEL - Abnormal; Notable for the following components:       Result Value    Glucose 102 (*)     All other components within normal limits "    Narrative:     GFR Normal >60  Chronic Kidney Disease <60  Kidney Failure <15     URINALYSIS W/ MICROSCOPIC IF INDICATED (NO CULTURE) - Abnormal; Notable for the following components:    Ketones, UA Trace (*)     All other components within normal limits    Narrative:     Urine microscopic not indicated.   TSH - Normal   CBC WITH AUTO DIFFERENTIAL - Normal   CBC AND DIFFERENTIAL    Narrative:     The following orders were created for panel order CBC & Differential.  Procedure                               Abnormality         Status                     ---------                               -----------         ------                     CBC Auto Differential[[id number]]        Normal              Final result                 Please view results for these tests on the individual orders.     Medications - No data to display  No radiology results for the last day                                         Medical Decision Making  Patient is a pleasant 49-year-old obese  male with a history of hypertension who presents to the emergency room with complaints of fatigue related to inability to sleep well for the past month.  On exam, patient has normal S1/S2.  No clicks murmurs.  No JVD.  Lungs are clear to auscultation in all fields.  His abdomen is soft and nontender with normal bowel sounds throughout.  Patient is alert and answers questions appropriately.  Initial differentials include electrolyte imbalance, hyperthyroidism, dehydration, acute UTI.  This is not a complete list.    Due to overwhelming hospital census and boarding inpatients in the emergency room, patient received above examination in hallway bed.  Examination was made to the best of provider's ability with respect to patient privacy and confidentiality.  IV was established and labs were obtained while patient was evaluated by triage provider who placed initial orders.  After being placed in main ER treatment area, I assumed care.  His labs are  unremarkable with no leukocytosis or anemia.  BMP is with normal kidney function.  His TSH is within normal limits and there is no evidence of acute UTI.  Upon chart review, it was noted the patient had C-spine x-ray images completed at urgent care center 6/25 and found to be unremarkable.  There he also had EKG which was negative for acute findings.  Upon my assessment, chest x-ray was considered but not completed at this time because patient has not had cough or concern for pneumonias.  Results were discussed with the patient.  He informed that he is spoken to his cardiologist regarding concerns for sleep apnea  and my advice the patient is to have a sleep study completed given the fatigue related to his sleep disturbances over the past month.  I also discussed the relation of sleep apnea with cardiac diseases.  Patient verbalized understanding and is agreeable to discharge at this time with follow-up to his primary care provider for sleep study.  If this study is unremarkable, I encouraged him to follow-up to his cardiologist for further direction.  I offered patient muscle relaxers and Voltaren for treatment of radiculopathy/muscle strain causing his shoulder pain but patient has declined at this time, stating he would rather see his primary care provider first.  Patient verbalized understanding and is agreeable.  He is hemodynamically stable and ambulated upright, steadily and without assistance upon discharge.  No acute distress noted.    I discussed the findings with patient who voices understanding of discharge instructions, signs and symptoms requiring return to the ED; discharged improved and stable condition with follow-up for reevaluation.    Patient is aware that discharge does not mean that nothing is wrong but it indicates no emergency is present and they must continue care with follow-up as given below or physician of their choice.    This document is intended for medical expert use only.  Reading of  this document by patients and/or patient's family without participating medical staff guidance may result in misinterpretation and unintended morbidity.  Any interpretation of such data is the responsibility of the patient and/or family member responsible for the patient in concert with their primary or specialist providers, not to be left for sources of online search as such as [employer], [employer] or similar queries.  Relying on these approaches to knowledge may result in misinterpretation, misguided goals of care and even death should patient or family members try recommendations outside of the realm of professional medical care in a supervised inpatient environment.    This medical document was created using Dragon dictation system. Some errors in speech recognition may occur.    Final diagnoses:   Sleep disturbances   Tiredness       ED Disposition  ED Disposition       ED Disposition   Discharge    Condition   Stable    Comment   --               Gigi Espinal MD  [address] [place] [address] [place] [address] [phone]               Medication List      No changes were made to your prescriptions during this visit.            Jen Ballesteros, APRN  07/11/24 9792

## 2024-07-11 NOTE — ED NOTES
Pt states he has had numbness in left arm X 1 month and started to have pain in left arm yesterday. Pt also reports swelling in bilateral legs X 1 month. Pt states he has been feeling weak the last couple of weeks.

## 2024-07-12 NOTE — DISCHARGE INSTRUCTIONS
Continue taking previously prescribed occasions as directed.  Rest.  Get plenty of fluids and avoid dehydrating compounds such as caffeine, coffee, tea and soda.    Follow-up with your primary care provider for further evaluation and referral for sleep study.  If this is negative, consider following up to cardiology for further advice.    Return to the ER for new or worsening symptoms.

## 2024-11-07 ENCOUNTER — HOSPITAL ENCOUNTER (EMERGENCY)
Facility: HOSPITAL | Age: 49
Discharge: HOME | End: 2024-11-07
Attending: EMERGENCY MEDICINE
Payer: COMMERCIAL

## 2024-11-07 VITALS
WEIGHT: 247.4 LBS | HEIGHT: 67 IN | TEMPERATURE: 98.2 F | HEART RATE: 88 BPM | OXYGEN SATURATION: 98 % | RESPIRATION RATE: 16 BRPM | DIASTOLIC BLOOD PRESSURE: 88 MMHG | SYSTOLIC BLOOD PRESSURE: 121 MMHG | BODY MASS INDEX: 38.83 KG/M2

## 2024-11-07 DIAGNOSIS — R60.0 ARM EDEMA: Primary | ICD-10-CM

## 2024-11-07 PROCEDURE — 99282 EMERGENCY DEPT VISIT SF MDM: CPT

## 2024-11-08 ENCOUNTER — HOSPITAL ENCOUNTER (OUTPATIENT)
Dept: CARDIOLOGY | Facility: HOSPITAL | Age: 49
Discharge: HOME | End: 2024-11-08
Admitting: EMERGENCY MEDICINE
Payer: COMMERCIAL

## 2024-11-08 DIAGNOSIS — R60.0 ARM EDEMA: ICD-10-CM

## 2024-11-08 LAB
BH CV UPPER VENOUS LEFT INTERNAL JUGULAR AUGMENT: NORMAL
BH CV UPPER VENOUS LEFT INTERNAL JUGULAR COMPRESS: NORMAL
BH CV UPPER VENOUS LEFT INTERNAL JUGULAR PHASIC: NORMAL
BH CV UPPER VENOUS LEFT INTERNAL JUGULAR SPONT: NORMAL
BH CV UPPER VENOUS LEFT SUBCLAVIAN AUGMENT: NORMAL
BH CV UPPER VENOUS LEFT SUBCLAVIAN COMPRESS: NORMAL
BH CV UPPER VENOUS LEFT SUBCLAVIAN PHASIC: NORMAL
BH CV UPPER VENOUS LEFT SUBCLAVIAN SPONT: NORMAL
BH CV UPPER VENOUS RIGHT AXILLARY AUGMENT: NORMAL
BH CV UPPER VENOUS RIGHT AXILLARY COMPRESS: NORMAL
BH CV UPPER VENOUS RIGHT AXILLARY PHASIC: NORMAL
BH CV UPPER VENOUS RIGHT AXILLARY SPONT: NORMAL
BH CV UPPER VENOUS RIGHT BASILIC FOREARM COMPRESS: NORMAL
BH CV UPPER VENOUS RIGHT BASILIC UPPER COMPRESS: NORMAL
BH CV UPPER VENOUS RIGHT BRACHIAL COMPRESS: NORMAL
BH CV UPPER VENOUS RIGHT CEPHALIC FOREARM COMPRESS: NORMAL
BH CV UPPER VENOUS RIGHT CEPHALIC UPPER COMPRESS: NORMAL
BH CV UPPER VENOUS RIGHT INTERNAL JUGULAR AUGMENT: NORMAL
BH CV UPPER VENOUS RIGHT INTERNAL JUGULAR COMPRESS: NORMAL
BH CV UPPER VENOUS RIGHT INTERNAL JUGULAR PHASIC: NORMAL
BH CV UPPER VENOUS RIGHT INTERNAL JUGULAR SPONT: NORMAL
BH CV UPPER VENOUS RIGHT RADIAL COMPRESS: NORMAL
BH CV UPPER VENOUS RIGHT SUBCLAVIAN AUGMENT: NORMAL
BH CV UPPER VENOUS RIGHT SUBCLAVIAN COMPRESS: NORMAL
BH CV UPPER VENOUS RIGHT SUBCLAVIAN PHASIC: NORMAL
BH CV UPPER VENOUS RIGHT SUBCLAVIAN SPONT: NORMAL
BH CV UPPER VENOUS RIGHT ULNAR COMPRESS: NORMAL

## 2024-11-08 PROCEDURE — 93971 EXTREMITY STUDY: CPT

## 2024-11-08 NOTE — ED PROVIDER NOTES
Subjective   History of Present Illness  49-year-old male presents for follow right upper extremity swelling.  Was working outside today noticed that over his lateral right forearm began to have some swelling.  Noticed that then extended down over to his hand as well.  Very little pain.  No history of blood clots.  Not on anticoagulation.  States that swelling and pain on improved now compared to prior.  Patient denies any chest pain or shortness of breath.  Review of Systems  See HPI.  Past Medical History:   Diagnosis Date    Hyperlipidemia     Hypertension        No Known Allergies    Past Surgical History:   Procedure Laterality Date    EAR TUBES      FACIAL FRACTURE SURGERY  2019    ORBITAL FRACTURE OPEN REDUCTION INTERNAL FIXATION      TONSILLECTOMY         Family History   Problem Relation Age of Onset    No Known Problems Mother     Heart attack Father     No Known Problems Sister     No Known Problems Brother     No Known Problems Maternal Aunt     No Known Problems Maternal Uncle     No Known Problems Paternal Aunt     No Known Problems Paternal Uncle     Stroke Maternal Grandmother     Heart attack Maternal Grandfather     No Known Problems Paternal Grandmother     No Known Problems Paternal Grandfather     No Known Problems Other     Anemia Neg Hx     Arrhythmia Neg Hx     Asthma Neg Hx     Clotting disorder Neg Hx     Fainting Neg Hx     Heart disease Neg Hx     Heart failure Neg Hx     Hyperlipidemia Neg Hx     Hypertension Neg Hx        Social History     Socioeconomic History    Marital status: Single   Tobacco Use    Smoking status: Former     Passive exposure: Past    Smokeless tobacco: Never   Vaping Use    Vaping status: Never Used   Substance and Sexual Activity    Alcohol use: Yes     Comment: OCC.    Drug use: Never    Sexual activity: Not Currently           Objective   Physical Exam  No acute distress, parents palpation over right proximal lateral forearm, intact PT and DP pulses, strength  "sensation intact, distal cap refill less than 2 seconds, no overlying ecchymosis, normal range of motion elbow and wrist, no tenderness palpation over brachial vein.  Procedures           ED Course      /88   Pulse 88   Temp 98.2 °F (36.8 °C)   Resp 16   Ht 170.2 cm (67\")   Wt 112 kg (247 lb 6.4 oz)   SpO2 98%   BMI 38.75 kg/m²                                            Medical Decision Making  Discussed with patient ultrasound not here currently.  Ordered ultrasound for in the morning.  Discussed anticoagulation with patient.  He wishes to defer at this time and does not want to be started on prophylactic anticoagulation.  Outpatient ultrasound ordered.  DC'd home.  Return ER precautions discussed.    Final diagnoses:   Arm edema       ED Disposition  ED Disposition       ED Disposition   Discharge    Condition   Stable    Comment   --               Gigi Espinal MD  [address] [facility] [place] [id number] [phone]    In 2 days           Medication List      No changes were made to your prescriptions during this visit.            Klaus Hernandez MD  11/08/24 0244    "

## 2024-12-03 ENCOUNTER — APPOINTMENT (OUTPATIENT)
Dept: GENERAL RADIOLOGY | Facility: HOSPITAL | Age: 49
End: 2024-12-03
Payer: COMMERCIAL

## 2024-12-03 ENCOUNTER — HOSPITAL ENCOUNTER (OUTPATIENT)
Facility: HOSPITAL | Age: 49
Setting detail: OBSERVATION
Discharge: HOME | End: 2024-12-06
Attending: EMERGENCY MEDICINE | Admitting: INTERNAL MEDICINE
Payer: COMMERCIAL

## 2024-12-03 ENCOUNTER — APPOINTMENT (OUTPATIENT)
Dept: CT IMAGING | Facility: HOSPITAL | Age: 49
End: 2024-12-03
Payer: COMMERCIAL

## 2024-12-03 DIAGNOSIS — I26.99 ACUTE PULMONARY EMBOLISM WITHOUT ACUTE COR PULMONALE, UNSPECIFIED PULMONARY EMBOLISM TYPE: Primary | ICD-10-CM

## 2024-12-03 DIAGNOSIS — R07.9 CHEST PAIN, UNSPECIFIED TYPE: ICD-10-CM

## 2024-12-03 LAB
ALBUMIN SERPL-MCNC: 4.5 G/DL (ref 3.5–5.2)
ALBUMIN/GLOB SERPL: 1.6 G/DL
ALP SERPL-CCNC: 119 U/L (ref 39–117)
ALT SERPL W P-5'-P-CCNC: 48 U/L (ref 1–41)
ANION GAP SERPL CALCULATED.3IONS-SCNC: 10.4 MMOL/L (ref 5–15)
APTT PPP: 29 SECONDS (ref 22.7–35.4)
AST SERPL-CCNC: 46 U/L (ref 1–40)
BASOPHILS # BLD AUTO: 0.05 10*3/MM3 (ref 0–0.2)
BASOPHILS NFR BLD AUTO: 1.2 % (ref 0–1.5)
BILIRUB SERPL-MCNC: 0.4 MG/DL (ref 0–1.2)
BUN SERPL-MCNC: 11 MG/DL (ref 6–20)
BUN/CREAT SERPL: 11.2 (ref 7–25)
CALCIUM SPEC-SCNC: 9.5 MG/DL (ref 8.6–10.5)
CHLORIDE SERPL-SCNC: 101 MMOL/L (ref 98–107)
CO2 SERPL-SCNC: 27.6 MMOL/L (ref 22–29)
CREAT SERPL-MCNC: 0.98 MG/DL (ref 0.76–1.27)
D DIMER PPP FEU-MCNC: 0.75 MCGFEU/ML (ref 0–0.5)
DEPRECATED RDW RBC AUTO: 39.1 FL (ref 37–54)
EGFRCR SERPLBLD CKD-EPI 2021: 93.9 ML/MIN/1.73
EOSINOPHIL # BLD AUTO: 0.11 10*3/MM3 (ref 0–0.4)
EOSINOPHIL NFR BLD AUTO: 2.6 % (ref 0.3–6.2)
ERYTHROCYTE [DISTWIDTH] IN BLOOD BY AUTOMATED COUNT: 12.6 % (ref 12.3–15.4)
GEN 5 1HR TROPONIN T REFLEX: 7 NG/L
GLOBULIN UR ELPH-MCNC: 2.9 GM/DL
GLUCOSE SERPL-MCNC: 88 MG/DL (ref 65–99)
HCT VFR BLD AUTO: 41.6 % (ref 37.5–51)
HGB BLD-MCNC: 14.8 G/DL (ref 13–17.7)
IMM GRANULOCYTES # BLD AUTO: 0.02 10*3/MM3 (ref 0–0.05)
IMM GRANULOCYTES NFR BLD AUTO: 0.5 % (ref 0–0.5)
INR PPP: 1.16 (ref 0.9–1.1)
LYMPHOCYTES # BLD AUTO: 1.37 10*3/MM3 (ref 0.7–3.1)
LYMPHOCYTES NFR BLD AUTO: 31.8 % (ref 19.6–45.3)
MCH RBC QN AUTO: 30.4 PG (ref 26.6–33)
MCHC RBC AUTO-ENTMCNC: 35.6 G/DL (ref 31.5–35.7)
MCV RBC AUTO: 85.4 FL (ref 79–97)
MONOCYTES # BLD AUTO: 0.74 10*3/MM3 (ref 0.1–0.9)
MONOCYTES NFR BLD AUTO: 17.2 % (ref 5–12)
NEUTROPHILS NFR BLD AUTO: 2.02 10*3/MM3 (ref 1.7–7)
NEUTROPHILS NFR BLD AUTO: 46.7 % (ref 42.7–76)
NRBC BLD AUTO-RTO: 0 /100 WBC (ref 0–0.2)
PLATELET # BLD AUTO: 211 10*3/MM3 (ref 140–450)
PMV BLD AUTO: 9.8 FL (ref 6–12)
POTASSIUM SERPL-SCNC: 3.7 MMOL/L (ref 3.5–5.2)
PROT SERPL-MCNC: 7.4 G/DL (ref 6–8.5)
PROTHROMBIN TIME: 14.9 SECONDS (ref 11.7–14.2)
RBC # BLD AUTO: 4.87 10*6/MM3 (ref 4.14–5.8)
SODIUM SERPL-SCNC: 139 MMOL/L (ref 136–145)
TROPONIN T DELTA: -4 NG/L
TROPONIN T SERPL HS-MCNC: 11 NG/L
WBC NRBC COR # BLD AUTO: 4.31 10*3/MM3 (ref 3.4–10.8)

## 2024-12-03 PROCEDURE — 96365 THER/PROPH/DIAG IV INF INIT: CPT

## 2024-12-03 PROCEDURE — 25010000002 HEPARIN (PORCINE) 25000-0.45 UT/250ML-% SOLUTION: Performed by: EMERGENCY MEDICINE

## 2024-12-03 PROCEDURE — 71275 CT ANGIOGRAPHY CHEST: CPT

## 2024-12-03 PROCEDURE — G0378 HOSPITAL OBSERVATION PER HR: HCPCS

## 2024-12-03 PROCEDURE — 93005 ELECTROCARDIOGRAM TRACING: CPT

## 2024-12-03 PROCEDURE — 85610 PROTHROMBIN TIME: CPT | Performed by: EMERGENCY MEDICINE

## 2024-12-03 PROCEDURE — 99285 EMERGENCY DEPT VISIT HI MDM: CPT

## 2024-12-03 PROCEDURE — 80053 COMPREHEN METABOLIC PANEL: CPT | Performed by: EMERGENCY MEDICINE

## 2024-12-03 PROCEDURE — 84484 ASSAY OF TROPONIN QUANT: CPT | Performed by: EMERGENCY MEDICINE

## 2024-12-03 PROCEDURE — 36415 COLL VENOUS BLD VENIPUNCTURE: CPT

## 2024-12-03 PROCEDURE — 96366 THER/PROPH/DIAG IV INF ADDON: CPT

## 2024-12-03 PROCEDURE — 71045 X-RAY EXAM CHEST 1 VIEW: CPT

## 2024-12-03 PROCEDURE — 85025 COMPLETE CBC W/AUTO DIFF WBC: CPT | Performed by: EMERGENCY MEDICINE

## 2024-12-03 PROCEDURE — 93005 ELECTROCARDIOGRAM TRACING: CPT | Performed by: EMERGENCY MEDICINE

## 2024-12-03 PROCEDURE — 85730 THROMBOPLASTIN TIME PARTIAL: CPT | Performed by: EMERGENCY MEDICINE

## 2024-12-03 PROCEDURE — 25510000001 IOPAMIDOL PER 1 ML: Performed by: EMERGENCY MEDICINE

## 2024-12-03 PROCEDURE — 85379 FIBRIN DEGRADATION QUANT: CPT | Performed by: EMERGENCY MEDICINE

## 2024-12-03 RX ORDER — IOPAMIDOL 755 MG/ML
100 INJECTION, SOLUTION INTRAVASCULAR
Status: COMPLETED | OUTPATIENT
Start: 2024-12-03 | End: 2024-12-03

## 2024-12-03 RX ORDER — ONDANSETRON 2 MG/ML
4 INJECTION INTRAMUSCULAR; INTRAVENOUS EVERY 6 HOURS PRN
Status: DISCONTINUED | OUTPATIENT
Start: 2024-12-03 | End: 2024-12-06 | Stop reason: HOSPADM

## 2024-12-03 RX ORDER — POLYETHYLENE GLYCOL 3350 17 G/17G
17 POWDER, FOR SOLUTION ORAL DAILY PRN
Status: DISCONTINUED | OUTPATIENT
Start: 2024-12-03 | End: 2024-12-06 | Stop reason: HOSPADM

## 2024-12-03 RX ORDER — BISACODYL 10 MG
10 SUPPOSITORY, RECTAL RECTAL DAILY PRN
Status: DISCONTINUED | OUTPATIENT
Start: 2024-12-03 | End: 2024-12-06 | Stop reason: HOSPADM

## 2024-12-03 RX ORDER — ACETAMINOPHEN 325 MG/1
650 TABLET ORAL EVERY 4 HOURS PRN
Status: DISCONTINUED | OUTPATIENT
Start: 2024-12-03 | End: 2024-12-06 | Stop reason: HOSPADM

## 2024-12-03 RX ORDER — SODIUM CHLORIDE 0.9 % (FLUSH) 0.9 %
10 SYRINGE (ML) INJECTION AS NEEDED
Status: DISCONTINUED | OUTPATIENT
Start: 2024-12-03 | End: 2024-12-06 | Stop reason: HOSPADM

## 2024-12-03 RX ORDER — ACETAMINOPHEN 160 MG/5ML
650 SOLUTION ORAL EVERY 4 HOURS PRN
Status: DISCONTINUED | OUTPATIENT
Start: 2024-12-03 | End: 2024-12-06 | Stop reason: HOSPADM

## 2024-12-03 RX ORDER — ONDANSETRON 4 MG/1
4 TABLET, ORALLY DISINTEGRATING ORAL EVERY 6 HOURS PRN
Status: DISCONTINUED | OUTPATIENT
Start: 2024-12-03 | End: 2024-12-06 | Stop reason: HOSPADM

## 2024-12-03 RX ORDER — HYDRALAZINE HYDROCHLORIDE 20 MG/ML
10 INJECTION INTRAMUSCULAR; INTRAVENOUS EVERY 6 HOURS PRN
Status: DISCONTINUED | OUTPATIENT
Start: 2024-12-03 | End: 2024-12-06 | Stop reason: HOSPADM

## 2024-12-03 RX ORDER — AMOXICILLIN 250 MG
2 CAPSULE ORAL 2 TIMES DAILY PRN
Status: DISCONTINUED | OUTPATIENT
Start: 2024-12-03 | End: 2024-12-06 | Stop reason: HOSPADM

## 2024-12-03 RX ORDER — ASPIRIN 81 MG/1
81 TABLET ORAL DAILY PRN
COMMUNITY
End: 2024-12-06 | Stop reason: HOSPADM

## 2024-12-03 RX ORDER — ACETAMINOPHEN 650 MG/1
650 SUPPOSITORY RECTAL EVERY 4 HOURS PRN
Status: DISCONTINUED | OUTPATIENT
Start: 2024-12-03 | End: 2024-12-06 | Stop reason: HOSPADM

## 2024-12-03 RX ORDER — NITROGLYCERIN 0.4 MG/1
0.4 TABLET SUBLINGUAL
Status: DISCONTINUED | OUTPATIENT
Start: 2024-12-03 | End: 2024-12-06 | Stop reason: HOSPADM

## 2024-12-03 RX ORDER — BISACODYL 5 MG/1
5 TABLET, DELAYED RELEASE ORAL DAILY PRN
Status: DISCONTINUED | OUTPATIENT
Start: 2024-12-03 | End: 2024-12-06 | Stop reason: HOSPADM

## 2024-12-03 RX ORDER — SODIUM CHLORIDE 9 MG/ML
40 INJECTION, SOLUTION INTRAVENOUS AS NEEDED
Status: DISCONTINUED | OUTPATIENT
Start: 2024-12-03 | End: 2024-12-06 | Stop reason: HOSPADM

## 2024-12-03 RX ORDER — CETIRIZINE HYDROCHLORIDE 10 MG/1
10 TABLET ORAL DAILY PRN
COMMUNITY

## 2024-12-03 RX ORDER — HEPARIN SODIUM 10000 [USP'U]/100ML
13.4 INJECTION, SOLUTION INTRAVENOUS
Status: DISCONTINUED | OUTPATIENT
Start: 2024-12-03 | End: 2024-12-06 | Stop reason: HOSPADM

## 2024-12-03 RX ORDER — SODIUM CHLORIDE 0.9 % (FLUSH) 0.9 %
10 SYRINGE (ML) INJECTION EVERY 12 HOURS SCHEDULED
Status: DISCONTINUED | OUTPATIENT
Start: 2024-12-03 | End: 2024-12-06 | Stop reason: HOSPADM

## 2024-12-03 RX ORDER — DIPHENHYDRAMINE HCL 25 MG
25 CAPSULE ORAL NIGHTLY PRN
Status: DISCONTINUED | OUTPATIENT
Start: 2024-12-03 | End: 2024-12-06 | Stop reason: HOSPADM

## 2024-12-03 RX ADMIN — HEPARIN SODIUM 13.4 UNITS/KG/HR: 10000 INJECTION, SOLUTION INTRAVENOUS at 20:03

## 2024-12-03 RX ADMIN — IOPAMIDOL 100 ML: 755 INJECTION, SOLUTION INTRAVENOUS at 18:05

## 2024-12-03 RX ADMIN — Medication 10 ML: at 20:02

## 2024-12-03 RX ADMIN — Medication 10 ML: at 21:00

## 2024-12-03 NOTE — Clinical Note
Level of Care: Telemetry [5]   Diagnosis: Pulmonary emboli [604310]   Certification: I Certify That Inpatient Hospital Services Are Medically Necessary For Greater Than 2 Midnights

## 2024-12-04 PROBLEM — I26.99 PULMONARY EMBOLISM: Status: ACTIVE | Noted: 2024-12-04

## 2024-12-04 LAB
APTT PPP: 191.7 SECONDS (ref 22.7–35.4)
APTT PPP: 54.7 SECONDS (ref 22.7–35.4)
APTT PPP: 69.7 SECONDS (ref 22.7–35.4)
QT INTERVAL: 339 MS
QTC INTERVAL: 438 MS

## 2024-12-04 PROCEDURE — 85730 THROMBOPLASTIN TIME PARTIAL: CPT | Performed by: EMERGENCY MEDICINE

## 2024-12-04 PROCEDURE — G0378 HOSPITAL OBSERVATION PER HR: HCPCS

## 2024-12-04 PROCEDURE — 96375 TX/PRO/DX INJ NEW DRUG ADDON: CPT

## 2024-12-04 PROCEDURE — 96366 THER/PROPH/DIAG IV INF ADDON: CPT

## 2024-12-04 PROCEDURE — 85730 THROMBOPLASTIN TIME PARTIAL: CPT | Performed by: INTERNAL MEDICINE

## 2024-12-04 PROCEDURE — 25010000002 HYDRALAZINE PER 20 MG: Performed by: NURSE PRACTITIONER

## 2024-12-04 PROCEDURE — 81241 F5 GENE: CPT | Performed by: INTERNAL MEDICINE

## 2024-12-04 PROCEDURE — 25010000002 HEPARIN (PORCINE) 25000-0.45 UT/250ML-% SOLUTION: Performed by: EMERGENCY MEDICINE

## 2024-12-04 RX ORDER — AMLODIPINE BESYLATE 5 MG/1
5 TABLET ORAL DAILY
Status: DISCONTINUED | OUTPATIENT
Start: 2024-12-05 | End: 2024-12-06 | Stop reason: HOSPADM

## 2024-12-04 RX ORDER — ROSUVASTATIN CALCIUM 10 MG/1
10 TABLET, COATED ORAL DAILY
Status: DISCONTINUED | OUTPATIENT
Start: 2024-12-04 | End: 2024-12-06 | Stop reason: HOSPADM

## 2024-12-04 RX ORDER — CETIRIZINE HYDROCHLORIDE 10 MG/1
10 TABLET ORAL DAILY PRN
Status: DISCONTINUED | OUTPATIENT
Start: 2024-12-04 | End: 2024-12-06 | Stop reason: HOSPADM

## 2024-12-04 RX ADMIN — HEPARIN SODIUM 15.4 UNITS/KG/HR: 10000 INJECTION, SOLUTION INTRAVENOUS at 05:40

## 2024-12-04 RX ADMIN — Medication 10 ML: at 08:35

## 2024-12-04 RX ADMIN — HEPARIN SODIUM 12.4 UNITS/KG/HR: 10000 INJECTION, SOLUTION INTRAVENOUS at 20:23

## 2024-12-04 RX ADMIN — HEPARIN SODIUM 15.4 UNITS/KG/HR: 10000 INJECTION, SOLUTION INTRAVENOUS at 08:33

## 2024-12-04 RX ADMIN — HEPARIN SODIUM 12.4 UNITS/KG/HR: 10000 INJECTION, SOLUTION INTRAVENOUS at 13:44

## 2024-12-04 RX ADMIN — Medication 10 ML: at 20:27

## 2024-12-04 RX ADMIN — HYDRALAZINE HYDROCHLORIDE 10 MG: 20 INJECTION, SOLUTION INTRAMUSCULAR; INTRAVENOUS at 22:23

## 2024-12-04 RX ADMIN — HEPARIN SODIUM 17.4 UNITS/KG/HR: 10000 INJECTION, SOLUTION INTRAVENOUS at 08:23

## 2024-12-04 RX ADMIN — ROSUVASTATIN 10 MG: 10 TABLET, FILM COATED ORAL at 17:39

## 2024-12-04 NOTE — CASE MANAGEMENT/SOCIAL WORK
Discharge Planning Assessment   Buzz     Patient Name: Robby Lopez  MRN: [id number]  Today's Date: 12/4/2024    Admit Date: 12/3/2024    Plan: From home w/family.   Discharge Needs Assessment       Row Name 12/04/24 0949       Living Environment    People in Home parent(s)    Name(s) of People in Home Erna Stephen    Current Living Arrangements home    Potentially Unsafe Housing Conditions none    In the past 12 months has the electric, gas, oil, or water company threatened to shut off services in your home? No    Primary Care Provided by self    Provides Primary Care For no one    Family Caregiver if Needed parent(s)    Family Caregiver Names Erna Stephen    Quality of Family Relationships involved;helpful;supportive    Able to Return to Prior Arrangements yes       Resource/Environmental Concerns    Resource/Environmental Concerns none    Transportation Concerns none       Transportation Needs    In the past 12 months, has lack of transportation kept you from medical appointments or from getting medications? no    In the past 12 months, has lack of transportation kept you from meetings, work, or from getting things needed for daily living? No       Food Insecurity    Within the past 12 months, you worried that your food would run out before you got the money to buy more. Never true    Within the past 12 months, the food you bought just didn't last and you didn't have money to get more. Never true       Transition Planning    Patient/Family Anticipates Transition to home    Patient/Family Anticipated Services at Transition none    Transportation Anticipated family or friend will provide       Discharge Needs Assessment    Readmission Within the Last 30 Days no previous admission in last 30 days    Equipment Currently Used at Home bp cuff    Concerns to be Addressed discharge planning    Anticipated Changes Related to Illness none    Equipment Needed After Discharge none                   Discharge Plan        Row Name 12/04/24 0953       Plan    Plan From home w/family.    Patient/Family in Agreement with Plan yes    Plan Comments Patient lives at home with his parents. Patient does drive and his family will transport at discharge. Patient can do ADL. PCP (Kylie) and pharmacy ([facility]) confirmed. Patient declines MTB. Patient declines finanical assistance needs with medications and/or food. Denies PT and/or HH needs. Discharge barriers: hep gtt, increased d-dimer, CRITICAL PTT 69.5                  Continued Care and Services - Admitted Since 12/3/2024    No active coordination exists for this encounter.          Demographic Summary       Row Name 12/04/24 0949       General Information    Admission Type observation    Arrived From emergency department    Referral Source admission list    Reason for Consult discharge planning    Preferred Language English       Contact Information    Permission Granted to Share Info With                    Functional Status       Row Name 12/04/24 0949       Functional Status    Usual Activity Tolerance good    Current Activity Tolerance good       Functional Status, IADL    Medications independent    Meal Preparation independent    Housekeeping independent    Laundry independent    Shopping independent                 Met with patient in room wearing PPE: mask.    Maintained distance greater than six feet and spent less than 15 minutes in the room.       Cassidy Luna RN

## 2024-12-04 NOTE — ED PROVIDER NOTES
Subjective   History of Present Illness  Chief complaint tightness in chest elevated blood pressure    History of present illness a 49-year-old gentleman who states that he has had some tightness in his chest throughout the day mostly on the left side but no neck arm jaw pain no dizziness or syncope some shortness of breath.  Nothing really seems to trigger or make it better or worse it is mild to moderate degree.  He denies any recent long car ride plane ride immobilization foreign travels antibiotic use or hospitalization or injury.  No fever chills no recent vaccinations.  He does have a family history of heart disease and a history of hypertension hyperlipidemia      Review of Systems   Constitutional:  Negative for chills and fever.   Respiratory:  Positive for chest tightness and shortness of breath.    Cardiovascular:  Positive for chest pain. Negative for palpitations.   Gastrointestinal:  Negative for abdominal pain and vomiting.   Musculoskeletal:  Negative for back pain and neck pain.   Skin:  Negative for rash.   Neurological:  Negative for dizziness and light-headedness.       Past Medical History:   Diagnosis Date    Hyperlipidemia     Hypertension        No Known Allergies    Past Surgical History:   Procedure Laterality Date    EAR TUBES      FACIAL FRACTURE SURGERY  2019    ORBITAL FRACTURE OPEN REDUCTION INTERNAL FIXATION      TONSILLECTOMY         Family History   Problem Relation Age of Onset    No Known Problems Mother     Heart attack Father     No Known Problems Sister     No Known Problems Brother     No Known Problems Maternal Aunt     No Known Problems Maternal Uncle     No Known Problems Paternal Aunt     No Known Problems Paternal Uncle     Stroke Maternal Grandmother     Heart attack Maternal Grandfather     No Known Problems Paternal Grandmother     No Known Problems Paternal Grandfather     No Known Problems Other     Anemia Neg Hx     Arrhythmia Neg Hx     Asthma Neg Hx     Clotting  disorder Neg Hx     Fainting Neg Hx     Heart disease Neg Hx     Heart failure Neg Hx     Hyperlipidemia Neg Hx     Hypertension Neg Hx        Social History     Socioeconomic History    Marital status: Single   Tobacco Use    Smoking status: Former     Passive exposure: Past    Smokeless tobacco: Never   Vaping Use    Vaping status: Never Used   Substance and Sexual Activity    Alcohol use: Yes     Comment: OCC.    Drug use: Never    Sexual activity: Not Currently     Prior to Admission medications    Medication Sig Start Date End Date Taking? Authorizing Provider   amLODIPine (NORVASC) 5 MG tablet Take 1 tablet by mouth Daily. 5/29/20  Yes Jose Verde MD   aspirin 325 MG EC tablet Take 1 tablet by mouth Daily As Needed for Mild Pain.   Yes Vishal Chris MD   aspirin 81 MG EC tablet Take 1 tablet by mouth Daily As Needed for Mild Pain.   Yes Vishal Chris MD   cetirizine (zyrTEC) 10 MG tablet Take 1 tablet by mouth Daily As Needed for Allergies.   Yes Vishal Chris MD   lisinopril-hydrochlorothiazide (PRINZIDE,ZESTORETIC) 20-25 MG per tablet Take 1 tablet by mouth Daily. 8/31/22  Yes Vishal Chris MD   rosuvastatin (CRESTOR) 10 MG tablet Take 1 tablet by mouth Daily. 9/16/22  Yes Vishal Chris MD   methylPREDNISolone (MEDROL) 4 MG dose pack Take as directed on package instructions. 8/6/24 12/3/24  Alyssia Dorsey APRN          Objective   Physical Exam  Constitutional is a 49-year-old awake alert no acute distress triage vital signs reviewed.  HEENT extraocular muscles are intact pupils equal round reactive sclera clear mouth clear neck supple no adenopathy no JVD no bruits lungs clear no retraction no use of accessory heart regular without murmur abdomen soft nontender good bowel sounds no peritoneal findings or pulsatile masses extremities pulses equal upper and lower extremities no edema no cords no Homans' sign no evidence of DVT skin is warm and dry without rashes or  cellulitic changes neurologic awake alert follows commands motor strength normal without focal weakness  Procedures           ED Course      Results for orders placed or performed during the hospital encounter of 12/03/24   ECG 12 Lead Chest Pain    Collection Time: 12/03/24  1:54 PM   Result Value Ref Range    QT Interval 339 ms    QTC Interval 438 ms   Comprehensive Metabolic Panel    Collection Time: 12/03/24  3:01 PM    Specimen: Blood   Result Value Ref Range    Glucose 88 65 - 99 mg/dL    BUN 11 6 - 20 mg/dL    Creatinine 0.98 0.76 - 1.27 mg/dL    Sodium 139 136 - 145 mmol/L    Potassium 3.7 3.5 - 5.2 mmol/L    Chloride 101 98 - 107 mmol/L    CO2 27.6 22.0 - 29.0 mmol/L    Calcium 9.5 8.6 - 10.5 mg/dL    Total Protein 7.4 6.0 - 8.5 g/dL    Albumin 4.5 3.5 - 5.2 g/dL    ALT (SGPT) 48 (H) 1 - 41 U/L    AST (SGOT) 46 (H) 1 - 40 U/L    Alkaline Phosphatase 119 (H) 39 - 117 U/L    Total Bilirubin 0.4 0.0 - 1.2 mg/dL    Globulin 2.9 gm/dL    A/G Ratio 1.6 g/dL    BUN/Creatinine Ratio 11.2 7.0 - 25.0    Anion Gap 10.4 5.0 - 15.0 mmol/L    eGFR 93.9 >60.0 mL/min/1.73   High Sensitivity Troponin T    Collection Time: 12/03/24  3:01 PM    Specimen: Blood   Result Value Ref Range    HS Troponin T 11 <22 ng/L   D-dimer, Quantitative    Collection Time: 12/03/24  3:01 PM    Specimen: Blood   Result Value Ref Range    D-Dimer, Quantitative 0.75 (H) 0.00 - 0.50 MCGFEU/mL   CBC Auto Differential    Collection Time: 12/03/24  3:01 PM    Specimen: Blood   Result Value Ref Range    WBC 4.31 3.40 - 10.80 10*3/mm3    RBC 4.87 4.14 - 5.80 10*6/mm3    Hemoglobin 14.8 13.0 - 17.7 g/dL    Hematocrit 41.6 37.5 - 51.0 %    MCV 85.4 79.0 - 97.0 fL    MCH 30.4 26.6 - 33.0 pg    MCHC 35.6 31.5 - 35.7 g/dL    RDW 12.6 12.3 - 15.4 %    RDW-SD 39.1 37.0 - 54.0 fl    MPV 9.8 6.0 - 12.0 fL    Platelets 211 140 - 450 10*3/mm3    Neutrophil % 46.7 42.7 - 76.0 %    Lymphocyte % 31.8 19.6 - 45.3 %    Monocyte % 17.2 (H) 5.0 - 12.0 %    Eosinophil %  2.6 0.3 - 6.2 %    Basophil % 1.2 0.0 - 1.5 %    Immature Grans % 0.5 0.0 - 0.5 %    Neutrophils, Absolute 2.02 1.70 - 7.00 10*3/mm3    Lymphocytes, Absolute 1.37 0.70 - 3.10 10*3/mm3    Monocytes, Absolute 0.74 0.10 - 0.90 10*3/mm3    Eosinophils, Absolute 0.11 0.00 - 0.40 10*3/mm3    Basophils, Absolute 0.05 0.00 - 0.20 10*3/mm3    Immature Grans, Absolute 0.02 0.00 - 0.05 10*3/mm3    nRBC 0.0 0.0 - 0.2 /100 WBC   High Sensitivity Troponin T 2Hr    Collection Time: 12/03/24  5:36 PM    Specimen: Arm, Left; Blood   Result Value Ref Range    HS Troponin T 7 <22 ng/L    Troponin T Delta -4 (L) >=-4 - <+4 ng/L   Protime-INR    Collection Time: 12/03/24  7:48 PM    Specimen: Blood   Result Value Ref Range    Protime 14.9 (H) 11.7 - 14.2 Seconds    INR 1.16 (H) 0.90 - 1.10   aPTT    Collection Time: 12/03/24  7:48 PM    Specimen: Blood   Result Value Ref Range    PTT 29.0 22.7 - 35.4 seconds     CT Angiogram Chest Pulmonary Embolism    Result Date: 12/3/2024  Impression: Possible pulmonary emboli noted within the right lower lobe segmental and subsegmental branches, although evaluation is significantly limited due to contrast timing. No pulmonary emboli within the central pulmonary arteries or lobar branches.. Electronically Signed: Skip Braden DO  12/3/2024 6:39 PM EST  Workstation ID: [id number]    XR Chest 1 View    Result Date: 12/3/2024  Impression: No acute process. Electronically Signed: Aleksandr Deng MD  12/3/2024 2:37 PM EST  Workstation ID: [id number]   Medications   sodium chloride 0.9 % flush 10 mL (has no administration in time range)   heparin 04837 units/250 mL (100 units/mL) in 0.45 % NaCl infusion (13.4 Units/kg/hr × 112 kg Intravenous Currently Infusing 12/3/24 2203)   heparin bolus from bag solution 4,500 Units (has no administration in time range)   heparin bolus from bag solution 9,000 Units (has no administration in time range)   sodium chloride 0.9 % flush 10 mL (10 mL Intravenous Given 12/3/24  2100)   sodium chloride 0.9 % flush 10 mL (10 mL Intravenous Given 12/3/24 2002)   sodium chloride 0.9 % infusion 40 mL (has no administration in time range)   nitroglycerin (NITROSTAT) SL tablet 0.4 mg (has no administration in time range)   Potassium Replacement - Follow Nurse / BPA Driven Protocol (has no administration in time range)   Magnesium Standard Dose Replacement - Follow Nurse / BPA Driven Protocol (has no administration in time range)   Phosphorus Replacement - Follow Nurse / BPA Driven Protocol (has no administration in time range)   Calcium Replacement - Follow Nurse / BPA Driven Protocol (has no administration in time range)   acetaminophen (TYLENOL) tablet 650 mg (has no administration in time range)     Or   acetaminophen (TYLENOL) 160 MG/5ML oral solution 650 mg (has no administration in time range)     Or   acetaminophen (TYLENOL) suppository 650 mg (has no administration in time range)   ondansetron ODT (ZOFRAN-ODT) disintegrating tablet 4 mg (has no administration in time range)     Or   ondansetron (ZOFRAN) injection 4 mg (has no administration in time range)   diphenhydrAMINE (BENADRYL) capsule 25 mg (has no administration in time range)   sennosides-docusate (PERICOLACE) 8.6-50 MG per tablet 2 tablet (has no administration in time range)     And   polyethylene glycol (MIRALAX) packet 17 g (has no administration in time range)     And   bisacodyl (DULCOLAX) EC tablet 5 mg (has no administration in time range)     And   bisacodyl (DULCOLAX) suppository 10 mg (has no administration in time range)   hydrALAZINE (APRESOLINE) injection 10 mg (has no administration in time range)   iopamidol (ISOVUE-370) 76 % injection 100 mL (100 mL Intravenous Given 12/3/24 1805)   heparin bolus from bag solution 9,000 Units (9,000 Units Intravenous Bolus from Bag 12/3/24 2006)                                              EKG my interpretation normal sinus rhythm rate of 100 normal axis no hypertrophy QTc of 438  normal EKG unchanged from 9/14/2022          Medical Decision Making  Medical decision making.  Patient had IV established monitor placement review of sinus rhythm EKG my interpretation normal sinus rhythm rate of 100 normal axis hypertrophy QTc of 438 normal EKG unchanged from 9/14/2022.  Labs obtained by independent review comprehensive metabolic profile remarkable for an ALT of 48 AST of 46 alk phos of 119 troponins negative D-dimer 0.75 CBC unremarkable second troponin 2 hours later was 7 fell about 4.  INR 1.16.  Chest x-ray my independent review no pneumonia pneumothorax failure acute findings.  D-dimer elevated shortness of breath tightness in the chest there is a family history of factor V deficiency according to the family member with him.  The patient underwent a CT PE protocol my independent review do not see any central PE seen dissection or pneumonia.  Radiology review possible pulmonary emboli noted within the right lower lobe segmental and subsegmental branches contrast timing is not the best will no central PE.  The patient here on repeat exam was resting comfortably in light of the elevated D-dimer and a family history of factor deficiency and the shortness of breath and chest tightness the patient was started on a heparin protocol drip.  Bolus and drip.  He was made aware of the findings.  I do not see evidence suggestive dissection or pericarditis myocarditis pericardial effusion or sepsis or bacteremia myocardial infarction although not a complete list of all possibilities.  I talked to the primary care provider Case discussed stable otherwise unremarkable ER course.  Stable currently on a heparin drip.    Problems Addressed:  Acute pulmonary embolism without acute cor pulmonale, unspecified pulmonary embolism type: complicated acute illness or injury  Chest pain, unspecified type: complicated acute illness or injury    Amount and/or Complexity of Data Reviewed  Labs: ordered. Decision-making  details documented in ED Course.  Radiology: ordered and independent interpretation performed. Decision-making details documented in ED Course.  ECG/medicine tests: ordered and independent interpretation performed. Decision-making details documented in ED Course.    Risk  Drug therapy requiring intensive monitoring for toxicity.  Decision regarding hospitalization.        Final diagnoses:   Acute pulmonary embolism without acute cor pulmonale, unspecified pulmonary embolism type   Chest pain, unspecified type       ED Disposition  ED Disposition       ED Disposition   Decision to Admit    Condition   --    Comment   Level of Care: Telemetry [5]   Diagnosis: Pulmonary emboli [453224]   Certification: I Certify That Inpatient Hospital Services Are Medically Necessary For Greater Than 2 Midnights                 No follow-up provider specified.       Medication List      No changes were made to your prescriptions during this visit.            Vishnu Armenta MD  12/03/24 4852

## 2024-12-04 NOTE — PROGRESS NOTES
Transitions of Care (test claim):    Patient insurance type: Commercial Insurance - they are eligible to use a monthly  discount card in the future  Is patient signed up for M2B? no    Drug Eliquis:   Covered/PA Required: Covered without PA  Copay Per Month: $117.01  Is the medication eligible for a trial card/copay card? Free trial available from  and copay card available  Drug Xarelto:   Covered/PA Required: Covered without PA  Copay Per Month: $112.13  Is the medication eligible for a trial card/copay card? Free trial available from  and copay card available    For billing questions please call [facility] Pharmacist at ext: [phone]  For M2B questions please call [facility] at ext: [phone]     Lela Chao PharmD, BCPS

## 2024-12-04 NOTE — NURSING NOTE
Pt biological sister at bedside states she has Factor 5 Leiden, wondering if the pt also has this factor, causing his clot.

## 2024-12-04 NOTE — H&P
"    Patient Care Team:  Gigi Espinal MD as PCP - General (Family Medicine)  Jose Verde MD as Consulting Physician (Cardiology)    Chief complaint left sided chest pain    Subjective     Patient is a 49 y.o. male with h/o HTN, HLD who presents with sudden onset of left sided chest pain, nonradiating while at work, unrelated to exertion.  He took aspirin and pain subsided but then returned a few minutes later.  BP noted to be elevated at work and on arrival to ED.  He had associated soa and \"drenching\" sweats with chest pain.  He has no known CAD.  Workup in ER showed elevated D-dimer.  CT-PE protocol suggested small right sided subsegmental pulmonary emboli.  He has a family h/o Factor V Leiden.  He is currently pain free.      Review of Systems   Constitutional:  Positive for diaphoresis. Negative for activity change, appetite change, chills, fatigue, fever and unexpected weight change.   HENT:  Negative for facial swelling.    Eyes:  Negative for visual disturbance.   Respiratory:  Positive for shortness of breath. Negative for cough, wheezing and stridor.    Cardiovascular:  Positive for chest pain. Negative for palpitations and leg swelling.   Gastrointestinal:  Negative for abdominal pain, constipation, diarrhea, nausea and vomiting.   Endocrine: Negative for polyuria.   Genitourinary:  Negative for dysuria.   Musculoskeletal:  Negative for arthralgias, back pain, gait problem, joint swelling and myalgias.   Neurological:  Negative for dizziness, seizures, light-headedness and headaches.   Psychiatric/Behavioral:  Negative for confusion.           History  Past Medical History:   Diagnosis Date    Hyperlipidemia     Hypertension      Past Surgical History:   Procedure Laterality Date    EAR TUBES      FACIAL FRACTURE SURGERY  2019    ORBITAL FRACTURE OPEN REDUCTION INTERNAL FIXATION      TONSILLECTOMY       Family History   Problem Relation Age of Onset    No Known Problems Mother     Heart attack Father "     No Known Problems Sister     No Known Problems Brother     No Known Problems Maternal Aunt     No Known Problems Maternal Uncle     No Known Problems Paternal Aunt     No Known Problems Paternal Uncle     Stroke Maternal Grandmother     Heart attack Maternal Grandfather     No Known Problems Paternal Grandmother     No Known Problems Paternal Grandfather     No Known Problems Other     Anemia Neg Hx     Arrhythmia Neg Hx     Asthma Neg Hx     Clotting disorder Neg Hx     Fainting Neg Hx     Heart disease Neg Hx     Heart failure Neg Hx     Hyperlipidemia Neg Hx     Hypertension Neg Hx      Social History     Tobacco Use    Smoking status: Former     Passive exposure: Past    Smokeless tobacco: Never   Vaping Use    Vaping status: Never Used   Substance Use Topics    Alcohol use: Yes     Comment: OCC.    Drug use: Never     (Not in a hospital admission)    Allergies:  Patient has no known allergies.    Objective     Vital Signs  Temp:  [97.5 °F (36.4 °C)-98.3 °F (36.8 °C)] 98.1 °F (36.7 °C)  Heart Rate:  [74-92] 92  Resp:  [15-20] 17  BP: (116-142)/(56-95) 132/79     Physical Exam:      General Appearance:    Alert, cooperative, in no acute distress, overweight   Head:    Normocephalic, without obvious abnormality, atraumatic   Eyes:            Lids and lashes normal, conjunctivae and sclerae normal, no   icterus, no pallor, corneas clear, PERRLA   Ears:    Ears appear intact with no abnormalities noted   Throat:   No oral lesions, no thrush, oral mucosa moist   Neck:   No adenopathy, supple, trachea midline, no thyromegaly, no   carotid bruit, no JVD   Lungs:     Clear to auscultation,respirations regular, even and                  unlabored    Heart:    Regular rhythm and normal rate, normal S1 and S2, no            murmur, no gallop, no rub, no click   Chest Wall:    No abnormalities observed   Abdomen:     Normal bowel sounds, no masses, no organomegaly, soft        non-tender, non-distended, no guarding, no  rebound                tenderness   Extremities:   Moves all extremities well, no edema, no cyanosis, no             redness   Pulses:   Pulses palpable and equal bilaterally   Skin:   No bleeding, bruising or rash   Lymph nodes:   No palpable adenopathy   Neurologic:   Cranial nerves 2 - 12 grossly intact, sensation intact, DTR       present and equal bilaterally       Results Review:     Imaging Results (Last 24 Hours)       Procedure Component Value Units Date/Time    CT Angiogram Chest Pulmonary Embolism [[id number]] Collected: 12/03/24 1834     Updated: 12/03/24 1841    Narrative:      CT ANGIOGRAM CHEST PULMONARY EMBOLISM    Date of Exam: 12/3/2024 6:04 PM EST    Indication: Pleuritic left chest pain short of breath elevated D-dimer leg swelling.    Comparison: 4/25/2012    Technique: Axial CT images were obtained of the chest after the uneventful intravenous administration of iodinated contrast utilizing pulmonary embolism protocol.  Sagittal and coronal reconstructions were performed.  Automated exposure control and   iterative reconstruction methods were used.      Findings:    Pulmonary arteries: Borderline opacification of the pulmonary arteries. The central pulmonary arteries and lobar branches are patent. Evaluation of the segmental subsegmental branches is very limited on this study given contrast timing. Possible small   pulmonary emboli are noted within the right lower lobe segmental and subsegmental branches. The pulmonary arteries are normal in caliber.    Aorta: Unremarkable.    Lungs and Pleura: No consolidation. The central airways are patent. No pleural effusion or pneumothorax.    Mediastinum/Faby: No lymphadenopathy. No suspicious mass.    Heart: Normal in size. No pericardial effusion. Normal RV/LV ratio.    Soft Tissue: Unremarkable.      Upper Abdomen: Borderline splenomegaly. Otherwise unremarkable    Bones: No acute osseous abnormality.        Impression:      Impression:  Possible  pulmonary emboli noted within the right lower lobe segmental and subsegmental branches, although evaluation is significantly limited due to contrast timing. No pulmonary emboli within the central pulmonary arteries or lobar branches..        Electronically Signed: Skip Braden DO    12/3/2024 6:39 PM EST    Workstation ID: [id number]             Lab Results (last 24 hours)       Procedure Component Value Units Date/Time    aPTT [[id number]]  (Abnormal) Collected: 12/04/24 1131    Specimen: Blood from Hand, Right Updated: 12/04/24 1207     .7 seconds     Factor 5 Leiden [[id number]] Collected: 12/04/24 1131    Specimen: Blood from Hand, Right Updated: 12/04/24 1152    aPTT [[id number]]  (Abnormal) Collected: 12/04/24 0433    Specimen: Blood Updated: 12/04/24 0510     PTT 69.7 seconds     Protime-INR [[id number]]  (Abnormal) Collected: 12/03/24 1948    Specimen: Blood Updated: 12/03/24 2051     Protime 14.9 Seconds      INR 1.16    aPTT [[id number]]  (Normal) Collected: 12/03/24 1948    Specimen: Blood Updated: 12/03/24 2051     PTT 29.0 seconds     High Sensitivity Troponin T 2Hr [[id number]]  (Abnormal) Collected: 12/03/24 1736    Specimen: Blood from Arm, Left Updated: 12/03/24 1759     HS Troponin T 7 ng/L      Troponin T Delta -4 ng/L     Narrative:      High Sensitive Troponin T Reference Range:  <14.0 ng/L- Negative Female for AMI  <22.0 ng/L- Negative Male for AMI  >=14 - Abnormal Female indicating possible myocardial injury.  >=22 - Abnormal Male indicating possible myocardial injury.   Clinicians would have to utilize clinical acumen, EKG, Troponin, and serial changes to determine if it is an Acute Myocardial Infarction or myocardial injury due to an underlying chronic condition.                  I reviewed the patient's new clinical results.    Assessment & Plan       Pulmonary emboli    Pulmonary embolism  - ct scan is equivocal; continue heparin for now; I am not convinced this is the source of  patient's symptoms and may be artifact vs incidental finding      Family h/o Factor V Leiden - check lab    Chest pain - intermittent left sided chest pain with associated dyspnea and diaphoresis is concerning for angina.  I am hesitant to order stress test with the question of PE.  Will ask cardiology to evaluate.    HTN - Readings are normal today - holding amlodipine    HLD - statin  Obesity - ongoing counseling          I discussed the patient's findings and my recommendations with patient.     Penny Lee MD  12/04/24  16:36 EST

## 2024-12-05 ENCOUNTER — APPOINTMENT (OUTPATIENT)
Dept: CARDIOLOGY | Facility: HOSPITAL | Age: 49
End: 2024-12-05
Payer: COMMERCIAL

## 2024-12-05 LAB
AORTIC DIMENSIONLESS INDEX: 0.56 (DI)
APTT PPP: 142 SECONDS (ref 22.7–35.4)
APTT PPP: 55.7 SECONDS (ref 22.7–35.4)
APTT PPP: >200 SECONDS (ref 22.7–35.4)
BASOPHILS # BLD AUTO: 0.04 10*3/MM3 (ref 0–0.2)
BASOPHILS NFR BLD AUTO: 0.9 % (ref 0–1.5)
BH CV ECHO MEAS - AO MAX PG: 9.2 MMHG
BH CV ECHO MEAS - AO MEAN PG: 5 MMHG
BH CV ECHO MEAS - AO V2 MAX: 152 CM/SEC
BH CV ECHO MEAS - AO V2 VTI: 25.8 CM
BH CV ECHO MEAS - AVA(I,D): 2.24 CM2
BH CV ECHO MEAS - EDV(CUBED): 97.3 ML
BH CV ECHO MEAS - EDV(MOD-SP4): 135 ML
BH CV ECHO MEAS - EF(MOD-BP): 60 %
BH CV ECHO MEAS - EF(MOD-SP4): 60.1 %
BH CV ECHO MEAS - ESV(CUBED): 27 ML
BH CV ECHO MEAS - ESV(MOD-SP4): 53.8 ML
BH CV ECHO MEAS - FS: 34.8 %
BH CV ECHO MEAS - IVS/LVPW: 0.91 CM
BH CV ECHO MEAS - IVSD: 1 CM
BH CV ECHO MEAS - LA DIMENSION: 3 CM
BH CV ECHO MEAS - LAT PEAK E' VEL: 13.3 CM/SEC
BH CV ECHO MEAS - LV DIASTOLIC VOL/BSA (35-75): 62.5 CM2
BH CV ECHO MEAS - LV MASS(C)D: 169.9 GRAMS
BH CV ECHO MEAS - LV MAX PG: 2.9 MMHG
BH CV ECHO MEAS - LV MEAN PG: 1 MMHG
BH CV ECHO MEAS - LV SYSTOLIC VOL/BSA (12-30): 24.9 CM2
BH CV ECHO MEAS - LV V1 MAX: 85.8 CM/SEC
BH CV ECHO MEAS - LV V1 VTI: 15.2 CM
BH CV ECHO MEAS - LVIDD: 4.6 CM
BH CV ECHO MEAS - LVIDS: 3 CM
BH CV ECHO MEAS - LVOT AREA: 3.8 CM2
BH CV ECHO MEAS - LVOT DIAM: 2.2 CM
BH CV ECHO MEAS - LVPWD: 1.1 CM
BH CV ECHO MEAS - MED PEAK E' VEL: 10.6 CM/SEC
BH CV ECHO MEAS - MV A MAX VEL: 99.6 CM/SEC
BH CV ECHO MEAS - MV DEC SLOPE: 592 CM/SEC2
BH CV ECHO MEAS - MV DEC TIME: 0.19 SEC
BH CV ECHO MEAS - MV E MAX VEL: 94.9 CM/SEC
BH CV ECHO MEAS - MV E/A: 0.95
BH CV ECHO MEAS - MV MAX PG: 4.7 MMHG
BH CV ECHO MEAS - MV MEAN PG: 2 MMHG
BH CV ECHO MEAS - MV P1/2T: 37 MSEC
BH CV ECHO MEAS - MV V2 VTI: 21.4 CM
BH CV ECHO MEAS - MVA(P1/2T): 6 CM2
BH CV ECHO MEAS - MVA(VTI): 2.7 CM2
BH CV ECHO MEAS - PA ACC TIME: 0.1 SEC
BH CV ECHO MEAS - PA V2 MAX: 97.7 CM/SEC
BH CV ECHO MEAS - RV MAX PG: 2.45 MMHG
BH CV ECHO MEAS - RV V1 MAX: 78.2 CM/SEC
BH CV ECHO MEAS - RV V1 VTI: 13 CM
BH CV ECHO MEAS - SV(LVOT): 57.8 ML
BH CV ECHO MEAS - SV(MOD-SP4): 81.2 ML
BH CV ECHO MEAS - SVI(LVOT): 26.7 ML/M2
BH CV ECHO MEAS - SVI(MOD-SP4): 37.6 ML/M2
BH CV ECHO MEAS - TAPSE (>1.6): 2.7 CM
BH CV ECHO MEASUREMENTS AVERAGE E/E' RATIO: 7.94
BH CV ECHO SHUNT ASSESSMENT PERFORMED (HIDDEN SCRIPTING): 1
BH CV LOWER VASCULAR LEFT COMMON FEMORAL AUGMENT: NORMAL
BH CV LOWER VASCULAR LEFT COMMON FEMORAL COMPETENT: NORMAL
BH CV LOWER VASCULAR LEFT COMMON FEMORAL COMPRESS: NORMAL
BH CV LOWER VASCULAR LEFT COMMON FEMORAL PHASIC: NORMAL
BH CV LOWER VASCULAR LEFT COMMON FEMORAL SPONT: NORMAL
BH CV LOWER VASCULAR LEFT DISTAL FEMORAL COMPRESS: NORMAL
BH CV LOWER VASCULAR LEFT GASTRONEMIUS COMPRESS: NORMAL
BH CV LOWER VASCULAR LEFT GREATER SAPH AK COMPRESS: NORMAL
BH CV LOWER VASCULAR LEFT GREATER SAPH BK COMPRESS: NORMAL
BH CV LOWER VASCULAR LEFT LESSER SAPH COMPRESS: NORMAL
BH CV LOWER VASCULAR LEFT MID FEMORAL AUGMENT: NORMAL
BH CV LOWER VASCULAR LEFT MID FEMORAL COMPETENT: NORMAL
BH CV LOWER VASCULAR LEFT MID FEMORAL COMPRESS: NORMAL
BH CV LOWER VASCULAR LEFT MID FEMORAL PHASIC: NORMAL
BH CV LOWER VASCULAR LEFT MID FEMORAL SPONT: NORMAL
BH CV LOWER VASCULAR LEFT PERONEAL COMPRESS: NORMAL
BH CV LOWER VASCULAR LEFT POPLITEAL AUGMENT: NORMAL
BH CV LOWER VASCULAR LEFT POPLITEAL COMPETENT: NORMAL
BH CV LOWER VASCULAR LEFT POPLITEAL COMPRESS: NORMAL
BH CV LOWER VASCULAR LEFT POPLITEAL PHASIC: NORMAL
BH CV LOWER VASCULAR LEFT POPLITEAL SPONT: NORMAL
BH CV LOWER VASCULAR LEFT POSTERIOR TIBIAL COMPRESS: NORMAL
BH CV LOWER VASCULAR LEFT PROXIMAL FEMORAL COMPRESS: NORMAL
BH CV LOWER VASCULAR LEFT SAPHENOFEMORAL JUNCTION COMPRESS: NORMAL
BH CV LOWER VASCULAR RIGHT COMMON FEMORAL AUGMENT: NORMAL
BH CV LOWER VASCULAR RIGHT COMMON FEMORAL COMPETENT: NORMAL
BH CV LOWER VASCULAR RIGHT COMMON FEMORAL COMPRESS: NORMAL
BH CV LOWER VASCULAR RIGHT COMMON FEMORAL PHASIC: NORMAL
BH CV LOWER VASCULAR RIGHT COMMON FEMORAL SPONT: NORMAL
BH CV LOWER VASCULAR RIGHT DISTAL FEMORAL COMPRESS: NORMAL
BH CV LOWER VASCULAR RIGHT GASTRONEMIUS COMPRESS: NORMAL
BH CV LOWER VASCULAR RIGHT GREATER SAPH AK COMPRESS: NORMAL
BH CV LOWER VASCULAR RIGHT GREATER SAPH BK COMPRESS: NORMAL
BH CV LOWER VASCULAR RIGHT LESSER SAPH COMPRESS: NORMAL
BH CV LOWER VASCULAR RIGHT MID FEMORAL AUGMENT: NORMAL
BH CV LOWER VASCULAR RIGHT MID FEMORAL COMPETENT: NORMAL
BH CV LOWER VASCULAR RIGHT MID FEMORAL COMPRESS: NORMAL
BH CV LOWER VASCULAR RIGHT MID FEMORAL PHASIC: NORMAL
BH CV LOWER VASCULAR RIGHT MID FEMORAL SPONT: NORMAL
BH CV LOWER VASCULAR RIGHT PERONEAL COMPRESS: NORMAL
BH CV LOWER VASCULAR RIGHT POPLITEAL AUGMENT: NORMAL
BH CV LOWER VASCULAR RIGHT POPLITEAL COMPETENT: NORMAL
BH CV LOWER VASCULAR RIGHT POPLITEAL COMPRESS: NORMAL
BH CV LOWER VASCULAR RIGHT POPLITEAL PHASIC: NORMAL
BH CV LOWER VASCULAR RIGHT POPLITEAL SPONT: NORMAL
BH CV LOWER VASCULAR RIGHT POSTERIOR TIBIAL COMPRESS: NORMAL
BH CV LOWER VASCULAR RIGHT PROXIMAL FEMORAL COMPRESS: NORMAL
BH CV LOWER VASCULAR RIGHT SAPHENOFEMORAL JUNCTION COMPRESS: NORMAL
BH CV XLRA - TDI S': 15 CM/SEC
DEPRECATED RDW RBC AUTO: 39.5 FL (ref 37–54)
EOSINOPHIL # BLD AUTO: 0.11 10*3/MM3 (ref 0–0.4)
EOSINOPHIL NFR BLD AUTO: 2.5 % (ref 0.3–6.2)
ERYTHROCYTE [DISTWIDTH] IN BLOOD BY AUTOMATED COUNT: 12.8 % (ref 12.3–15.4)
HCT VFR BLD AUTO: 40.7 % (ref 37.5–51)
HGB BLD-MCNC: 13.8 G/DL (ref 13–17.7)
IMM GRANULOCYTES # BLD AUTO: 0.02 10*3/MM3 (ref 0–0.05)
IMM GRANULOCYTES NFR BLD AUTO: 0.5 % (ref 0–0.5)
LYMPHOCYTES # BLD AUTO: 1.57 10*3/MM3 (ref 0.7–3.1)
LYMPHOCYTES NFR BLD AUTO: 35.4 % (ref 19.6–45.3)
MCH RBC QN AUTO: 29.2 PG (ref 26.6–33)
MCHC RBC AUTO-ENTMCNC: 33.9 G/DL (ref 31.5–35.7)
MCV RBC AUTO: 86 FL (ref 79–97)
MONOCYTES # BLD AUTO: 0.6 10*3/MM3 (ref 0.1–0.9)
MONOCYTES NFR BLD AUTO: 13.5 % (ref 5–12)
NEUTROPHILS NFR BLD AUTO: 2.09 10*3/MM3 (ref 1.7–7)
NEUTROPHILS NFR BLD AUTO: 47.2 % (ref 42.7–76)
NRBC BLD AUTO-RTO: 0 /100 WBC (ref 0–0.2)
PLATELET # BLD AUTO: 190 10*3/MM3 (ref 140–450)
PMV BLD AUTO: 9.8 FL (ref 6–12)
RBC # BLD AUTO: 4.73 10*6/MM3 (ref 4.14–5.8)
SINUS: 3.4 CM
STJ: 3.1 CM
WBC NRBC COR # BLD AUTO: 4.43 10*3/MM3 (ref 3.4–10.8)

## 2024-12-05 PROCEDURE — G0378 HOSPITAL OBSERVATION PER HR: HCPCS

## 2024-12-05 PROCEDURE — 25010000002 HEPARIN (PORCINE) 25000-0.45 UT/250ML-% SOLUTION: Performed by: EMERGENCY MEDICINE

## 2024-12-05 PROCEDURE — 99214 OFFICE O/P EST MOD 30 MIN: CPT | Performed by: INTERNAL MEDICINE

## 2024-12-05 PROCEDURE — 96366 THER/PROPH/DIAG IV INF ADDON: CPT

## 2024-12-05 PROCEDURE — 85025 COMPLETE CBC W/AUTO DIFF WBC: CPT | Performed by: EMERGENCY MEDICINE

## 2024-12-05 PROCEDURE — 93306 TTE W/DOPPLER COMPLETE: CPT | Performed by: INTERNAL MEDICINE

## 2024-12-05 PROCEDURE — 93970 EXTREMITY STUDY: CPT

## 2024-12-05 PROCEDURE — 93970 EXTREMITY STUDY: CPT | Performed by: SURGERY

## 2024-12-05 PROCEDURE — 85730 THROMBOPLASTIN TIME PARTIAL: CPT | Performed by: INTERNAL MEDICINE

## 2024-12-05 PROCEDURE — 93306 TTE W/DOPPLER COMPLETE: CPT

## 2024-12-05 RX ADMIN — AMLODIPINE BESYLATE 5 MG: 5 TABLET ORAL at 09:24

## 2024-12-05 RX ADMIN — HEPARIN SODIUM 13.4 UNITS/KG/HR: 10000 INJECTION, SOLUTION INTRAVENOUS at 05:14

## 2024-12-05 RX ADMIN — HEPARIN SODIUM 17.4 UNITS/KG/HR: 10000 INJECTION, SOLUTION INTRAVENOUS at 17:37

## 2024-12-05 RX ADMIN — ROSUVASTATIN 10 MG: 10 TABLET, FILM COATED ORAL at 09:24

## 2024-12-05 RX ADMIN — Medication 10 ML: at 10:03

## 2024-12-05 NOTE — PLAN OF CARE
Goal Outcome Evaluation:    Heparin drip titrated accordingly per protocol. Duplex scan shows normal bilateral lower extremity venous scan.

## 2024-12-05 NOTE — CASE MANAGEMENT/SOCIAL WORK
Continued Stay Note  [facility]     Patient Name: Robby Lopez  MRN: [id number]  Today's Date: 12/5/2024    Admit Date: 12/3/2024    Plan: Anticipate routine home with family. Watch anti-coagulation needs.   Discharge Plan       Row Name 12/05/24 1415       Plan    Plan Anticipate routine home with family. Watch anti-coagulation needs.    Plan Comments Barrier to D/C: heparin gtt, hematology consult.                      Expected Discharge Date and Time       Expected Discharge Date Expected Discharge Time    Dec 6, 2024           Phone communication or documentation only - no physical contact with patient or family.     ROGER [place], [place] [address]    Office: [phone]  Fax: [phone]

## 2024-12-05 NOTE — CONSULTS
CARDIOLOGY CONSULT:    Robby Lopez  1975  male  [id number]      Referring Provider: Hospitalist  Reason for Consultation: Shortness of breath and chest pain    Patient Care Team:  Gigi Espinal MD as PCP - General (Family Medicine)  Jose Verde MD as Consulting Physician (Cardiology)    Chief complaint chest pain and shortness of breath    Subjective .     History of present illness:  Robby Lopez is a 49 y.o. male with history of hypertension hyperlipidemia presented to hospital complains of left-sided chest pain and shortness of breath while he was at work.  Patient also had very high blood pressure.  He also had some sweating the next day and came to the ER and had elevated D-dimer but normal troponin.  Patient was then noted to have pulmonary embolism on the CT scan and was admitted to the hospital placed on heparin.  No complaints of any PND or orthopnea.  No palpitations dizziness syncope or swelling of the feet.  Review of Systems   Constitutional: Negative for fever and malaise/fatigue.   HENT:  Negative for ear pain and nosebleeds.    Eyes:  Negative for blurred vision and double vision.   Cardiovascular:  Positive for chest pain. Negative for dyspnea on exertion and palpitations.   Respiratory:  Positive for shortness of breath. Negative for cough.    Skin:  Negative for rash.   Musculoskeletal:  Negative for joint pain.   Gastrointestinal:  Negative for abdominal pain, nausea and vomiting.   Neurological:  Negative for focal weakness and headaches.   Psychiatric/Behavioral:  Negative for depression. The patient is not nervous/anxious.    All other systems reviewed and are negative.      History  Past Medical History:   Diagnosis Date    Hyperlipidemia     Hypertension     Seasonal allergies     Tendonitis of elbow, right        Past Surgical History:   Procedure Laterality Date    EAR TUBES      FACIAL FRACTURE SURGERY  2019    ORBITAL FRACTURE OPEN REDUCTION INTERNAL FIXATION       TONSILLECTOMY         Family History   Problem Relation Age of Onset    No Known Problems Mother     Heart attack Father     No Known Problems Sister     No Known Problems Brother     No Known Problems Maternal Aunt     No Known Problems Maternal Uncle     No Known Problems Paternal Aunt     No Known Problems Paternal Uncle     Stroke Maternal Grandmother     Heart attack Maternal Grandfather     No Known Problems Paternal Grandmother     No Known Problems Paternal Grandfather     No Known Problems Other     Anemia Neg Hx     Arrhythmia Neg Hx     Asthma Neg Hx     Clotting disorder Neg Hx     Fainting Neg Hx     Heart disease Neg Hx     Heart failure Neg Hx     Hyperlipidemia Neg Hx     Hypertension Neg Hx        Social History     Tobacco Use    Smoking status: Former     Current packs/day: 0.00     Average packs/day: 1 pack/day for 14.0 years (14.0 ttl pk-yrs)     Types: Cigarettes     Start date:      Quit date:      Years since quittin.9     Passive exposure: Past    Smokeless tobacco: Never   Vaping Use    Vaping status: Former   Substance Use Topics    Alcohol use: Yes     Alcohol/week: 4.0 - 5.0 standard drinks of alcohol     Types: 4 - 5 Cans of beer per week     Comment: OCC.    Drug use: Never        Medications Prior to Admission   Medication Sig Dispense Refill Last Dose/Taking    amLODIPine (NORVASC) 5 MG tablet Take 1 tablet by mouth Daily. 90 tablet 3 12/3/2024 Morning    aspirin 325 MG EC tablet Take 1 tablet by mouth Daily As Needed for Mild Pain.   12/3/2024 Morning    aspirin 81 MG EC tablet Take 1 tablet by mouth Daily As Needed for Mild Pain.   Past Week    cetirizine (zyrTEC) 10 MG tablet Take 1 tablet by mouth Daily As Needed for Allergies.   Past Week    lisinopril-hydrochlorothiazide (PRINZIDE,ZESTORETIC) 20-25 MG per tablet Take 1 tablet by mouth Daily.   12/3/2024 Morning    rosuvastatin (CRESTOR) 10 MG tablet Take 1 tablet by mouth Daily.   12/3/2024 Morning       Allergies:  "Patient has no known allergies.    Scheduled Meds:amLODIPine, 5 mg, Oral, Daily  rosuvastatin, 10 mg, Oral, Daily  sodium chloride, 10 mL, Intravenous, Q12H      Continuous Infusions:heparin, 13.4 Units/kg/hr, Last Rate: 13.4 Units/kg/hr (12/05/24 1134)      PRN Meds:.  acetaminophen **OR** acetaminophen **OR** acetaminophen    senna-docusate sodium **AND** polyethylene glycol **AND** bisacodyl **AND** bisacodyl    Calcium Replacement - Follow Nurse / BPA Driven Protocol    cetirizine    diphenhydrAMINE    heparin    heparin    hydrALAZINE    Magnesium Standard Dose Replacement - Follow Nurse / BPA Driven Protocol    nitroglycerin    ondansetron ODT **OR** ondansetron    Phosphorus Replacement - Follow Nurse / BPA Driven Protocol    Potassium Replacement - Follow Nurse / BPA Driven Protocol    [COMPLETED] Insert Peripheral IV **AND** sodium chloride    sodium chloride    sodium chloride    Objective     VITAL SIGNS  Vitals:    12/05/24 0400 12/05/24 0434 12/05/24 0846 12/05/24 1026   BP: 91/49 128/84 136/86 136/86   BP Location:  Right arm Right arm    Patient Position:  Lying Sitting    Pulse: 73 82 95    Resp: 14 20 18    Temp: 98.1 °F (36.7 °C) 99 °F (37.2 °C) 97.8 °F (36.6 °C)    TempSrc: Oral Oral Oral    SpO2: 95% 97% 99%    Weight:  106 kg (234 lb 9.1 oz)  106 kg (234 lb)   Height:  170.2 cm (67\")  170.2 cm (67\")       Flowsheet Rows      Flowsheet Row First Filed Value   Admission Height 170.2 cm (67\") Documented at 12/03/2024 1336   Admission Weight 112 kg (245 lb 13 oz) Documented at 12/03/2024 1336             TELEMETRY: Sinus rhythm    Physical Exam:  Constitutional:       Appearance: Well-developed.   Eyes:      General: No scleral icterus.     Conjunctiva/sclera: Conjunctivae normal.      Pupils: Pupils are equal, round, and reactive to light.   HENT:      Head: Normocephalic and atraumatic.   Neck:      Vascular: No carotid bruit or JVD.   Pulmonary:      Effort: Pulmonary effort is normal.      " Breath sounds: Normal breath sounds. No wheezing. No rales.   Cardiovascular:      Normal rate. Regular rhythm.   Pulses:     Intact distal pulses.   Abdominal:      General: Bowel sounds are normal.      Palpations: Abdomen is soft.   Musculoskeletal: Normal range of motion.      Cervical back: Normal range of motion and neck supple. Skin:     General: Skin is warm and dry.      Findings: No rash.   Neurological:      Mental Status: Alert.      Comments: No focal deficits          Results Review:   I reviewed the patient's new clinical results.  Lab Results (last 24 hours)       Procedure Component Value Units Date/Time    aPTT [[id number]]  (Abnormal) Collected: 12/05/24 1243    Specimen: Blood from Arm, Right Updated: 12/05/24 1300     PTT 55.7 seconds     Narrative:      Comm Log not indicated, patient on heparin.      aPTT [[id number]]  (Abnormal) Collected: 12/05/24 0256    Specimen: Blood from Arm, Right Updated: 12/05/24 0342     PTT >200.0 seconds     CBC & Differential [[id number]]  (Abnormal) Collected: 12/05/24 0256    Specimen: Blood from Arm, Right Updated: 12/05/24 0303    Narrative:      The following orders were created for panel order CBC & Differential.  Procedure                               Abnormality         Status                     ---------                               -----------         ------                     CBC Auto Differential[[id number]]        Abnormal            Final result                 Please view results for these tests on the individual orders.    CBC Auto Differential [[id number]]  (Abnormal) Collected: 12/05/24 0256    Specimen: Blood from Arm, Right Updated: 12/05/24 0303     WBC 4.43 10*3/mm3      RBC 4.73 10*6/mm3      Hemoglobin 13.8 g/dL      Hematocrit 40.7 %      MCV 86.0 fL      MCH 29.2 pg      MCHC 33.9 g/dL      RDW 12.8 %      RDW-SD 39.5 fl      MPV 9.8 fL      Platelets 190 10*3/mm3      Neutrophil % 47.2 %      Lymphocyte % 35.4 %      Monocyte % 13.5 %       Eosinophil % 2.5 %      Basophil % 0.9 %      Immature Grans % 0.5 %      Neutrophils, Absolute 2.09 10*3/mm3      Lymphocytes, Absolute 1.57 10*3/mm3      Monocytes, Absolute 0.60 10*3/mm3      Eosinophils, Absolute 0.11 10*3/mm3      Basophils, Absolute 0.04 10*3/mm3      Immature Grans, Absolute 0.02 10*3/mm3      nRBC 0.0 /100 WBC     aPTT [[id number]]  (Abnormal) Collected: 12/04/24 1923    Specimen: Blood from Arm, Right Updated: 12/04/24 1950     PTT 54.7 seconds             Imaging Results (Last 24 Hours)       ** No results found for the last 24 hours. **            EKG      I personally viewed and interpreted the patient's EKG/Telemetry data:    ECHOCARDIOGRAM:  Results for orders placed during the hospital encounter of 06/18/20    Adult Transthoracic Echo Complete W/ Cont if Necessary Per Protocol    Interpretation Summary  · Right ventricular cavity is mildly dilated.  · Mild tricuspid valve regurgitation is present.  · LV ejection fraction about 60%  · No pericardial effusion noted         STRESS MYOVIEW:  Results for orders placed during the hospital encounter of 06/18/20    Stress Test With Myocardial Perfusion One Day    Interpretation Summary  · Left ventricular ejection fraction is borderline normal (Calculated EF = 48%).  · Myocardial perfusion imaging indicates a normal myocardial perfusion study with no evidence of ischemia.  · Impressions are consistent with a low risk study.       CARDIAC CATHETERIZATION:    No results found for this or any previous visit.       OTHER:         MDM      Chest pain/shortness of breath  Patient is ruled out for MI by EKG and enzymes  Patient had a CT scan which showed pulmonary embolism and hence he is on heparin will switch to oral anticoagulation.  Patient had an echocardiogram today.  Patient does not need a stress test because he had a stress moistly few years ago which was normal and his symptoms are mostly secondary to PE  Patient also should not do a  stress test when he has PE  Patient will also have workup done by hematologist for secondary causes of PE.  Patient did not have any injury to his lower extremities or any prolonged immobilization    Hypertension  Patient's blood pressure is currently stable on medical therapy    Hyperlipidemia  Patient on statins and the lipid levels are followed by the primary care doctor.    I discussed the patients findings and my recommendations with patient and nurse    Jose Verde MD  12/05/24  13:14 EST

## 2024-12-05 NOTE — CONSULTS
Hematology/Oncology Inpatient Consultation    Patient name: Robby Lopez  : 1975  MRN: [id number]  Referring Provider: Dr. Lee  Reason for Consultation: Pulmonary embolism, family history of factor V Leiden    Chief complaint: Sudden onset left-sided chest pain    History of present illness:    Robby Lopez is a 49 y.o. male who presented to [facility] on 12/3/2024 with complaints of left-sided chest pain.  Past medical history of hypertension, hyperlipidemia.  At that he had sudden onset of left-sided chest pain that was nonradiating while at work and was unrelated to exertion.  He took aspirin and the pain subsided but then returned a few minutes later.  His blood pressure was noted to be elevated at work and on arrival to the ED.  He had associated shortness of breath and drenching sweats with the chest pain.  He had no known coronary artery disease.  Workup in the ER showed an elevated D-dimer of 0.75.  CT PE protocol suggested a small right-sided subsegmental pulmonary emboli.  He does have a family history of factor V Leiden mutation.    12/3/2024 CT chest PE protocol:  -Possible pulmonary emboli noted in the right lower lobe segmental and subsegmental branches, evaluation significantly limited due to contrast timing.  No pulmonary emboli within the central pulmonary arteries or lobar branches.    2024 bilateral lower extremity venous duplex  -Normal bilateral lower extremity skin    24  Hematology/Oncology was consulted for newly diagnosed pulmonary emboli in a patient with no personal history of VTE, family history of factor V Leiden.    He/She  has a past medical history of Hyperlipidemia, Hypertension, Seasonal allergies, and Tendonitis of elbow, right.    PCP: Gigi Espinal MD    History:  Past Medical History:   Diagnosis Date    Hyperlipidemia     Hypertension     Seasonal allergies     Tendonitis of elbow, right    ,   Past Surgical History:   Procedure  Laterality Date    EAR TUBES      FACIAL FRACTURE SURGERY  2019    ORBITAL FRACTURE OPEN REDUCTION INTERNAL FIXATION      TONSILLECTOMY     ,   Family History   Problem Relation Age of Onset    No Known Problems Mother     Heart attack Father     No Known Problems Sister     No Known Problems Brother     No Known Problems Maternal Aunt     No Known Problems Maternal Uncle     No Known Problems Paternal Aunt     No Known Problems Paternal Uncle     Stroke Maternal Grandmother     Heart attack Maternal Grandfather     No Known Problems Paternal Grandmother     No Known Problems Paternal Grandfather     No Known Problems Other     Anemia Neg Hx     Arrhythmia Neg Hx     Asthma Neg Hx     Clotting disorder Neg Hx     Fainting Neg Hx     Heart disease Neg Hx     Heart failure Neg Hx     Hyperlipidemia Neg Hx     Hypertension Neg Hx    ,   Social History     Tobacco Use    Smoking status: Former     Current packs/day: 0.00     Average packs/day: 1 pack/day for 14.0 years (14.0 ttl pk-yrs)     Types: Cigarettes     Start date:      Quit date:      Years since quittin.9     Passive exposure: Past    Smokeless tobacco: Never   Vaping Use    Vaping status: Former   Substance Use Topics    Alcohol use: Yes     Alcohol/week: 4.0 - 5.0 standard drinks of alcohol     Types: 4 - 5 Cans of beer per week     Comment: OCC.    Drug use: Never   ,   Medications Prior to Admission   Medication Sig Dispense Refill Last Dose/Taking    amLODIPine (NORVASC) 5 MG tablet Take 1 tablet by mouth Daily. 90 tablet 3 12/3/2024 Morning    aspirin 325 MG EC tablet Take 1 tablet by mouth Daily As Needed for Mild Pain.   12/3/2024 Morning    aspirin 81 MG EC tablet Take 1 tablet by mouth Daily As Needed for Mild Pain.   Past Week    cetirizine (zyrTEC) 10 MG tablet Take 1 tablet by mouth Daily As Needed for Allergies.   Past Week    lisinopril-hydrochlorothiazide (PRINZIDE,ZESTORETIC) 20-25 MG per tablet Take 1 tablet by mouth Daily.    "12/3/2024 Morning    rosuvastatin (CRESTOR) 10 MG tablet Take 1 tablet by mouth Daily.   12/3/2024 Morning   , Scheduled Meds:  amLODIPine, 5 mg, Oral, Daily  rosuvastatin, 10 mg, Oral, Daily  sodium chloride, 10 mL, Intravenous, Q12H    , Continuous Infusions:  heparin, 13.4 Units/kg/hr, Last Rate: 17.4 Units/kg/hr (12/05/24 1542)    , PRN Meds:    acetaminophen **OR** acetaminophen **OR** acetaminophen    senna-docusate sodium **AND** polyethylene glycol **AND** bisacodyl **AND** bisacodyl    Calcium Replacement - Follow Nurse / BPA Driven Protocol    cetirizine    diphenhydrAMINE    heparin    heparin    hydrALAZINE    Magnesium Standard Dose Replacement - Follow Nurse / BPA Driven Protocol    nitroglycerin    ondansetron ODT **OR** ondansetron    Phosphorus Replacement - Follow Nurse / BPA Driven Protocol    Potassium Replacement - Follow Nurse / BPA Driven Protocol    [COMPLETED] Insert Peripheral IV **AND** sodium chloride    sodium chloride    sodium chloride   Allergies:  Patient has no known allergies.    Subjective     ROS:  Review of Systems     Objective   Vital Signs:   /80 (BP Location: Right arm, Patient Position: Lying)   Pulse 97   Temp 98.4 °F (36.9 °C) (Oral)   Resp 19   Ht 170.2 cm (67\")   Wt 106 kg (234 lb)   SpO2 100%   BMI 36.65 kg/m²     Physical Exam: (performed by MD)  Physical Exam    Results Review:  Lab Results (last 48 hours)       Procedure Component Value Units Date/Time    aPTT [[id number]]  (Abnormal) Collected: 12/05/24 0256    Specimen: Blood from Arm, Right Updated: 12/05/24 0342     PTT >200.0 seconds     CBC & Differential [[id number]]  (Abnormal) Collected: 12/05/24 0256    Specimen: Blood from Arm, Right Updated: 12/05/24 0303    Narrative:      The following orders were created for panel order CBC & Differential.  Procedure                               Abnormality         Status                     ---------                               -----------         " ------                     CBC Auto Differential[[id number]]        Abnormal            Final result                 Please view results for these tests on the individual orders.    CBC Auto Differential [[id number]]  (Abnormal) Collected: 12/05/24 0256    Specimen: Blood from Arm, Right Updated: 12/05/24 0303     WBC 4.43 10*3/mm3      RBC 4.73 10*6/mm3      Hemoglobin 13.8 g/dL      Hematocrit 40.7 %      MCV 86.0 fL      MCH 29.2 pg      MCHC 33.9 g/dL      RDW 12.8 %      RDW-SD 39.5 fl      MPV 9.8 fL      Platelets 190 10*3/mm3      Neutrophil % 47.2 %      Lymphocyte % 35.4 %      Monocyte % 13.5 %      Eosinophil % 2.5 %      Basophil % 0.9 %      Immature Grans % 0.5 %      Neutrophils, Absolute 2.09 10*3/mm3      Lymphocytes, Absolute 1.57 10*3/mm3      Monocytes, Absolute 0.60 10*3/mm3      Eosinophils, Absolute 0.11 10*3/mm3      Basophils, Absolute 0.04 10*3/mm3      Immature Grans, Absolute 0.02 10*3/mm3      nRBC 0.0 /100 WBC     aPTT [[id number]]  (Abnormal) Collected: 12/04/24 1923    Specimen: Blood from Arm, Right Updated: 12/04/24 1950     PTT 54.7 seconds     aPTT [[id number]]  (Abnormal) Collected: 12/04/24 1131    Specimen: Blood from Hand, Right Updated: 12/04/24 1207     .7 seconds     Factor 5 Leiden [[id number]] Collected: 12/04/24 1131    Specimen: Blood from Hand, Right Updated: 12/04/24 1152    aPTT [[id number]]  (Abnormal) Collected: 12/04/24 0433    Specimen: Blood Updated: 12/04/24 0510     PTT 69.7 seconds     Protime-INR [[id number]]  (Abnormal) Collected: 12/03/24 1948    Specimen: Blood Updated: 12/03/24 2051     Protime 14.9 Seconds      INR 1.16    aPTT [[id number]]  (Normal) Collected: 12/03/24 1948    Specimen: Blood Updated: 12/03/24 2051     PTT 29.0 seconds     High Sensitivity Troponin T 2Hr [[id number]]  (Abnormal) Collected: 12/03/24 0706    Specimen: Blood from Arm, Left Updated: 12/03/24 1890     HS Troponin T 7 ng/L      Troponin T Delta -4 ng/L     Narrative:       High Sensitive Troponin T Reference Range:  <14.0 ng/L- Negative Female for AMI  <22.0 ng/L- Negative Male for AMI  >=14 - Abnormal Female indicating possible myocardial injury.  >=22 - Abnormal Male indicating possible myocardial injury.   Clinicians would have to utilize clinical acumen, EKG, Troponin, and serial changes to determine if it is an Acute Myocardial Infarction or myocardial injury due to an underlying chronic condition.         Comprehensive Metabolic Panel [[id number]]  (Abnormal) Collected: 12/03/24 1501    Specimen: Blood Updated: 12/03/24 1530     Glucose 88 mg/dL      BUN 11 mg/dL      Creatinine 0.98 mg/dL      Sodium 139 mmol/L      Potassium 3.7 mmol/L      Chloride 101 mmol/L      CO2 27.6 mmol/L      Calcium 9.5 mg/dL      Total Protein 7.4 g/dL      Albumin 4.5 g/dL      ALT (SGPT) 48 U/L      AST (SGOT) 46 U/L      Alkaline Phosphatase 119 U/L      Total Bilirubin 0.4 mg/dL      Globulin 2.9 gm/dL      A/G Ratio 1.6 g/dL      BUN/Creatinine Ratio 11.2     Anion Gap 10.4 mmol/L      eGFR 93.9 mL/min/1.73     Narrative:      GFR Normal >60  Chronic Kidney Disease <60  Kidney Failure <15      High Sensitivity Troponin T [[id number]]  (Normal) Collected: 12/03/24 1501    Specimen: Blood Updated: 12/03/24 1530     HS Troponin T 11 ng/L     Narrative:      High Sensitive Troponin T Reference Range:  <14.0 ng/L- Negative Female for AMI  <22.0 ng/L- Negative Male for AMI  >=14 - Abnormal Female indicating possible myocardial injury.  >=22 - Abnormal Male indicating possible myocardial injury.   Clinicians would have to utilize clinical acumen, EKG, Troponin, and serial changes to determine if it is an Acute Myocardial Infarction or myocardial injury due to an underlying chronic condition.         D-dimer, Quantitative [[id number]]  (Abnormal) Collected: 12/03/24 1501    Specimen: Blood Updated: 12/03/24 1524     D-Dimer, Quantitative 0.75 MCGFEU/mL     Narrative:      According to the assay  "'s published package insert, a normal (<0.50 MCGFEU/mL) D-dimer result in conjunction with a non-high clinical probability assessment, excludes deep vein thrombosis (DVT) and pulmonary embolism (PE) with high sensitivity.    D-dimer values increase with age and this can make VTE exclusion of an older population difficult. To address this, the [employer], based on best available evidence and recent guidelines, recommends that clinicians use age-adjusted D-dimer thresholds in patients greater than 50 years of age with: a) a low probability of PE who do not meet all Pulmonary Embolism Rule Out Criteria, or b) in those with intermediate probability of PE.   The formula for an age-adjusted D-dimer cut-off is \"age/100\".  For example, a 60 year old patient would have an age-adjusted cut-off of 0.60 MCGFEU/mL and an 80 year old 0.80 MCGFEU/mL.    CBC & Differential [[id number]]  (Abnormal) Collected: 12/03/24 1501    Specimen: Blood Updated: 12/03/24 1508    Narrative:      The following orders were created for panel order CBC & Differential.  Procedure                               Abnormality         Status                     ---------                               -----------         ------                     CBC Auto Differential[[id number]]        Abnormal            Final result                 Please view results for these tests on the individual orders.    CBC Auto Differential [[id number]]  (Abnormal) Collected: 12/03/24 1501    Specimen: Blood Updated: 12/03/24 1508     WBC 4.31 10*3/mm3      RBC 4.87 10*6/mm3      Hemoglobin 14.8 g/dL      Hematocrit 41.6 %      MCV 85.4 fL      MCH 30.4 pg      MCHC 35.6 g/dL      RDW 12.6 %      RDW-SD 39.1 fl      MPV 9.8 fL      Platelets 211 10*3/mm3      Neutrophil % 46.7 %      Lymphocyte % 31.8 %      Monocyte % 17.2 %      Eosinophil % 2.6 %      Basophil % 1.2 %      Immature Grans % 0.5 %      Neutrophils, Absolute 2.02 10*3/mm3      " Lymphocytes, Absolute 1.37 10*3/mm3      Monocytes, Absolute 0.74 10*3/mm3      Eosinophils, Absolute 0.11 10*3/mm3      Basophils, Absolute 0.05 10*3/mm3      Immature Grans, Absolute 0.02 10*3/mm3      nRBC 0.0 /100 WBC              Pending Results:     Imaging Reviewed:   CT Angiogram Chest Pulmonary Embolism    Result Date: 12/3/2024  Impression: Possible pulmonary emboli noted within the right lower lobe segmental and subsegmental branches, although evaluation is significantly limited due to contrast timing. No pulmonary emboli within the central pulmonary arteries or lobar branches.. Electronically Signed: Skip Braden DO  12/3/2024 6:39 PM EST  Workstation ID: [id number]    XR Chest 1 View    Result Date: 12/3/2024  Impression: No acute process. Electronically Signed: Aleksandr Deng MD  12/3/2024 2:37 PM EST  Workstation ID: [id number]          Assessment & Plan   ASSESSMENT  Pulmonary embolism: No noted provoking event.  Soft provoking factor of obesity.  Noted family history for factor V Leiden mutation.  This has been ordered and is in process.  Currently on anticoagulation with heparin. If no further procedures planned can be transitioned to oral anticoagulation.     PLAN  As above    Electronically signed by MOLLY Brian, 12/05/24, 11:14 AM EST.    12/5/2024: Mr. Lopez came to the hospital shortly after he started to have stabbing pain in the left chest, under the breast, at times associated to dyspnea.  This started suddenly, without an evident provoking factor, while at work.  He did not seem to improve though a dose of acetyl salicylic acid provided some relief.  He underwent some imaging after his D-dimer was elevated.  Questionable pulmonary embolism was documented on a CT with PE protocol.  No evidence of deep vein thrombosis of the lower extremities.  He has no significant past history.  He was a smoker for a while but has been abstinent now for several years.  He also used to drink  heavily but not recently.  He receives treatment for hypertension and hypercholesterolemia.  On exam he is a chronically ill-appearing man.  No distress.  No jaundice or pallor.  Very poor dentition but no oral lesions.  No jugular vein distention.  Lungs are diminished bilaterally and heart is regular and tachycardic.  Abdomen is protuberant.  There is no edema.  Reviewed the images and the report of the scans.  Reviewed the laboratory exams. Unsure he has a pulmonary embolism. Will start apixaban and obtain a V/Q scan.   I reviewed the records with Ms. Tom BARNES and concur with her note. I formulated the analysis and the plans.     Jabari Coleman MD on 12/5/2024 at 19:10

## 2024-12-06 ENCOUNTER — APPOINTMENT (OUTPATIENT)
Dept: NUCLEAR MEDICINE | Facility: HOSPITAL | Age: 49
End: 2024-12-06
Payer: COMMERCIAL

## 2024-12-06 ENCOUNTER — APPOINTMENT (OUTPATIENT)
Dept: GENERAL RADIOLOGY | Facility: HOSPITAL | Age: 49
End: 2024-12-06
Payer: COMMERCIAL

## 2024-12-06 VITALS
HEART RATE: 87 BPM | HEIGHT: 67 IN | BODY MASS INDEX: 37.34 KG/M2 | OXYGEN SATURATION: 98 % | WEIGHT: 237.88 LBS | DIASTOLIC BLOOD PRESSURE: 84 MMHG | TEMPERATURE: 98.1 F | SYSTOLIC BLOOD PRESSURE: 131 MMHG | RESPIRATION RATE: 12 BRPM

## 2024-12-06 PROBLEM — I10 ESSENTIAL HYPERTENSION: Status: ACTIVE | Noted: 2024-12-06

## 2024-12-06 LAB
APTT PPP: 71.4 SECONDS (ref 22.7–35.4)
F5 GENE MUT ANL BLD/T: ABNORMAL

## 2024-12-06 PROCEDURE — 34310000005 TECHNETIUM TC99M PYROPHOSPHATE: Performed by: INTERNAL MEDICINE

## 2024-12-06 PROCEDURE — 96366 THER/PROPH/DIAG IV INF ADDON: CPT

## 2024-12-06 PROCEDURE — 71046 X-RAY EXAM CHEST 2 VIEWS: CPT

## 2024-12-06 PROCEDURE — 34310000005 TECHNETIUM ALBUMIN AGGREGATED: Performed by: INTERNAL MEDICINE

## 2024-12-06 PROCEDURE — 99214 OFFICE O/P EST MOD 30 MIN: CPT | Performed by: INTERNAL MEDICINE

## 2024-12-06 PROCEDURE — G0378 HOSPITAL OBSERVATION PER HR: HCPCS

## 2024-12-06 PROCEDURE — 78582 LUNG VENTILAT&PERFUS IMAGING: CPT

## 2024-12-06 PROCEDURE — A9538 TC99M PYROPHOSPHATE: HCPCS | Performed by: INTERNAL MEDICINE

## 2024-12-06 PROCEDURE — A9540 TC99M MAA: HCPCS | Performed by: INTERNAL MEDICINE

## 2024-12-06 PROCEDURE — 85730 THROMBOPLASTIN TIME PARTIAL: CPT | Performed by: EMERGENCY MEDICINE

## 2024-12-06 RX ADMIN — AMLODIPINE BESYLATE 5 MG: 5 TABLET ORAL at 08:21

## 2024-12-06 RX ADMIN — TECHNETIUM TC99M PYROPHOSPHATE 1 DOSE: 12 INJECTION INTRAVENOUS at 10:26

## 2024-12-06 RX ADMIN — Medication 10 ML: at 08:26

## 2024-12-06 RX ADMIN — APIXABAN 10 MG: 5 TABLET, FILM COATED ORAL at 10:41

## 2024-12-06 RX ADMIN — ROSUVASTATIN 10 MG: 10 TABLET, FILM COATED ORAL at 08:21

## 2024-12-06 RX ADMIN — KIT FOR THE PREPARATION OF TECHNETIUM TC 99M ALBUMIN AGGREGATED 1 DOSE: 2.5 INJECTION, POWDER, FOR SOLUTION INTRAVENOUS at 10:26

## 2024-12-06 NOTE — PROGRESS NOTES
CARDIOLOGY PROGRESS NOTE:    Robby Lopez  49 y.o.  male  1975  [id number]      Referring Provider: Hospitalist    Reason for follow-up: Shortness of breath and chest pain     Patient Care Team:  Gigi Espinal MD as PCP - General (Family Medicine)  Jose Verde MD as Consulting Physician (Cardiology)    Subjective .  Patient doing well without any symptoms    Objective lying in bed comfortably     Review of Systems   Constitutional: Negative for malaise/fatigue.   Cardiovascular:  Negative for chest pain, dyspnea on exertion, leg swelling and palpitations.   Respiratory:  Negative for cough and shortness of breath.    Gastrointestinal:  Negative for abdominal pain, nausea and vomiting.   Neurological:  Negative for dizziness, focal weakness, headaches, light-headedness and numbness.   All other systems reviewed and are negative.      Allergies: Patient has no known allergies.    Scheduled Meds:amLODIPine, 5 mg, Oral, Daily  apixaban, 10 mg, Oral, BID   Followed by  [START ON 12/13/2024] apixaban, 5 mg, Oral, BID  rosuvastatin, 10 mg, Oral, Daily  sodium chloride, 10 mL, Intravenous, Q12H      Continuous Infusions:heparin, 13.4 Units/kg/hr, Last Rate: 14.4 Units/kg/hr (12/05/24 2224)      PRN Meds:.  acetaminophen **OR** acetaminophen **OR** acetaminophen    senna-docusate sodium **AND** polyethylene glycol **AND** bisacodyl **AND** bisacodyl    Calcium Replacement - Follow Nurse / BPA Driven Protocol    cetirizine    diphenhydrAMINE    heparin    heparin    hydrALAZINE    Magnesium Standard Dose Replacement - Follow Nurse / BPA Driven Protocol    nitroglycerin    ondansetron ODT **OR** ondansetron    Phosphorus Replacement - Follow Nurse / BPA Driven Protocol    Potassium Replacement - Follow Nurse / BPA Driven Protocol    [COMPLETED] Insert Peripheral IV **AND** sodium chloride    sodium chloride    sodium chloride        VITAL SIGNS  Vitals:    12/05/24 2212 12/06/24 0133 12/06/24 0548 12/06/24 0820  "  BP: 128/71 95/54 100/69 120/85   BP Location: Right arm Right arm Right arm Right arm   Patient Position: Lying Lying Lying Lying   Pulse: 88 94 87 89   Resp: 18 19 15 14   Temp: 97.9 °F (36.6 °C) 98 °F (36.7 °C) 98.3 °F (36.8 °C) 97.8 °F (36.6 °C)   TempSrc: Oral Oral Oral Oral   SpO2: 98% 97% 98% 98%   Weight:   108 kg (237 lb 14 oz)    Height:           Flowsheet Rows      Flowsheet Row First Filed Value   Admission Height 170.2 cm (67\") Documented at 12/03/2024 1336   Admission Weight 112 kg (245 lb 13 oz) Documented at 12/03/2024 1336             TELEMETRY: Sinus rhythm    Physical Exam:  Constitutional:       Appearance: Well-developed.   Eyes:      General: No scleral icterus.     Conjunctiva/sclera: Conjunctivae normal.   HENT:      Head: Normocephalic and atraumatic.   Neck:      Vascular: No carotid bruit or JVD.   Pulmonary:      Effort: Pulmonary effort is normal.      Breath sounds: Normal breath sounds. No wheezing. No rales.   Cardiovascular:      Normal rate. Regular rhythm.   Pulses:     Intact distal pulses.   Abdominal:      General: Bowel sounds are normal.      Palpations: Abdomen is soft.   Musculoskeletal:      Cervical back: Normal range of motion and neck supple. Skin:     General: Skin is warm and dry.      Findings: No rash.   Neurological:      Mental Status: Alert.          Results Review:   I reviewed the patient's new clinical results.  Lab Results (last 24 hours)       Procedure Component Value Units Date/Time    Factor 5 Leiden [[id number]]  (Abnormal) Collected: 12/04/24 1131    Specimen: Blood from Hand, Right Updated: 12/06/24 0839     Factor V Leiden Heterozygous    Narrative:      Factor V Leiden is a specific mutation ([id number]) in the factor V gene that is associated with an increased risk of venous thrombosis.  Factor V Leiden is more resistant to inactivation by activated protein C.  Factor V Leiden refers to the G to A transition at nucleotide position 1691 of the Factor V " gene, resulting in the substitution of the amino acid arginine by glutamine in the Factor V protein, causing resistance to cleavage by Activated Protein C (APC).    As a result, factor V persists in the circulation leading to a mild hyper-coagulable state.  The Leiden mutation accounts for 90% - 95% of APC resistance.  Factor V Leiden has been reported in patients with deep vein thrombosis, pulmonary embolus, central retinal vein occlusion, cerebral sinus thrombosis and hepatic vein thrombosis.  Other risk factors to be considered in the workup for venous thrombosis include the U13203T mutation in the factor II (prothrombin) gene, protein S and C deficiency, and antithrombin deficiencies. Anticardiolipin antibody and lupus anticoagulant analysis may be appropriate for certain patients, as well as homocysteine levels.    The expression of Factor V Leiden thrombophilia is impacted by coexisting genetic thrombophilic disorders, acquired thrombophilic disorders (malignancy, hyperhomocysteinemia, high factor VIII levels), and circumstances including: pregnancy, oral contraceptive use, hormone replacement therapy, selective estrogen receptor modulators, travel, central venous catheters, surgery, transplantation and advanced age.    In order to derive the most meaningful benefit from this testing, it may be necessary that the results and subsequent options from these complex genetic tests be discussed with patients by a trained genetic professional.    aPTT [[id number]]  (Abnormal) Collected: 12/06/24 0454    Specimen: Blood Updated: 12/06/24 0522     PTT 71.4 seconds     aPTT [[id number]]  (Abnormal) Collected: 12/05/24 1942    Specimen: Blood Updated: 12/05/24 2118     .0 seconds     aPTT [[id number]]  (Abnormal) Collected: 12/05/24 1243    Specimen: Blood from Arm, Right Updated: 12/05/24 1300     PTT 55.7 seconds     Narrative:      Comm Log not indicated, patient on heparin.              Imaging Results (Last 24  Hours)       Procedure Component Value Units Date/Time    XR Chest PA & Lateral [[id number]] Resulted: 12/06/24 1022     Updated: 12/06/24 1023    NM Lung Ventilation Perfusion Aerosol [[id number]] Resulted: 12/06/24 0916     Updated: 12/06/24 0916            EKG      I personally viewed and interpreted the patient's EKG/Telemetry data:    ECHOCARDIOGRAM:  Results for orders placed during the hospital encounter of 12/03/24    Adult Transthoracic Echo Complete W/ Cont if Necessary Per Protocol    Interpretation Summary    Left ventricular ejection fraction appears to be 56 - 60%.    Saline test results are negative.       STRESS MYOVIEW:  Results for orders placed during the hospital encounter of 06/18/20    Stress Test With Myocardial Perfusion One Day    Interpretation Summary  · Left ventricular ejection fraction is borderline normal (Calculated EF = 48%).  · Myocardial perfusion imaging indicates a normal myocardial perfusion study with no evidence of ischemia.  · Impressions are consistent with a low risk study.       CARDIAC CATHETERIZATION:  No results found for this or any previous visit.       OTHER:         Assessment & Plan     Chest pain/shortness of breath  Patient is ruled out for MI by EKG and enzymes  Patient had a CT scan which showed pulmonary embolism and hence he is on heparin will switch to oral anticoagulation.  Patient had an echocardiogram today.  Patient does not need a stress test because he had a stress moistly few years ago which was normal and his symptoms are mostly secondary to PE  Patient also should not do a stress test when he has PE  Patient will also have workup done by hematologist for secondary causes of PE.  Patient did not have any injury to his lower extremities or any prolonged immobilization  Patient is being switched to Eliquis  Patient is seen by hematologist but wants a VQ scan to confirm the PE  Patient will have outpatient stress testing when he is more stable if he  continues to have chest pains.     Hypertension  Patient's blood pressure is currently stable on medical therapy     Hyperlipidemia  Patient on statins and the lipid levels are followed by the primary care doctor.    I discussed the patients findings and my recommendations with patient and nurse    Jose Verde MD  12/06/24  10:25 EST

## 2024-12-06 NOTE — CASE MANAGEMENT/SOCIAL WORK
Continued Stay Note  [facility]     Patient Name: Robby Lopez  MRN: [id number]  Today's Date: 12/6/2024    Admit Date: 12/3/2024    Plan: Anticipate routine home with family. Eliquis free 30 days through [facility].   Discharge Plan       Row Name 12/06/24 1342       Plan    Plan Anticipate routine home with family. Eliquis free 30 days through [facility].    Plan Comments CM confirmed Eliquis cost with [facility], [facility] get free [facility] changed to Meds to B[facility]. DC orders noted.    Final Discharge Disposition Code 01 - home or self-care    Final Note Home with family                      Expected Discharge Date and Time       Expected Discharge Date Expected Discharge Time    Dec 6, 2024           Case Management Discharge Note      Final Note: Home with family         Selected Continued Care - Admitted Since 12/3/2024          Transportation Services  Private: Car    Final Discharge Disposition Code: 01 - home or self-care    Phone communication or documentation only - no physical contact with patient or family.   ROGER IvoryN, [place] [address]    Office: [phone]  Fax: [phone]        green

## 2024-12-06 NOTE — DISCHARGE SUMMARY
Date of Discharge:  12/6/2024    Discharge Diagnosis:   **Pulmonary emboli [I26.99]   Essential hypertension [I10]   Heterozygous Factor V Leiden    Presenting Problem/History of Present Illness  Active Hospital Problems    Diagnosis  POA    **Pulmonary emboli [I26.99]  Yes    Essential hypertension [I10]  Yes      Resolved Hospital Problems   No resolved problems to display.          Hospital Course  Patient is a 49 y.o. male with h/o hypertension presented with left sided chest pain.  He ruled out for ACS with normal troponin and unremarkable EKG.  D-dimer was elevated, so CT chest - PE protocol was ordered and showed questionable small subsegmental right sided Pe's.  Pt was started on heparin drip.  He is heterozygous for Factor V Leiden.  V/Q scan was low probability for PE.   Venous doppler was negative for DVT.  Symptoms did not recur.  Plan is to continue Eliquis for 3 months, then likely d/c.  There is no convincing evidence for PE and symptoms of left sided chest pain were likely not related to a possible right sided PE.  If left sided chest pain symptoms recur, he will undergo outpatient stress test.    BP was low normal of amlodipine alone.  He will hold lisinopril/HCTZ and monitor bp at home.  He has been given instructions to restart lisinopril/HCTZ if systolic is greater than 140 consistently.  He will bring BP readings with him to follow up appointments.    Procedures Performed         Consults:   Consults       Date and Time Order Name Status Description    12/5/2024  9:31 AM Hematology & Oncology Inpatient Consult Completed     12/4/2024  4:36 PM Inpatient Cardiology Consult Completed     12/3/2024  7:40 PM Family Medicine Consult              Pertinent Test Results:    Lab Results (most recent)       Procedure Component Value Units Date/Time    Factor 5 Leiden [[id number]]  (Abnormal) Collected: 12/04/24 1131    Specimen: Blood from Hand, Right Updated: 12/06/24 0839     Factor V Leiden  Heterozygous    Narrative:      Factor V Leiden is a specific mutation ([id number]) in the factor V gene that is associated with an increased risk of venous thrombosis.  Factor V Leiden is more resistant to inactivation by activated protein C.  Factor V Leiden refers to the G to A transition at nucleotide position 1691 of the Factor V gene, resulting in the substitution of the amino acid arginine by glutamine in the Factor V protein, causing resistance to cleavage by Activated Protein C (APC).    As a result, factor V persists in the circulation leading to a mild hyper-coagulable state.  The Leiden mutation accounts for 90% - 95% of APC resistance.  Factor V Leiden has been reported in patients with deep vein thrombosis, pulmonary embolus, central retinal vein occlusion, cerebral sinus thrombosis and hepatic vein thrombosis.  Other risk factors to be considered in the workup for venous thrombosis include the [id number] mutation in the factor II (prothrombin) gene, protein S and C deficiency, and antithrombin deficiencies. Anticardiolipin antibody and lupus anticoagulant analysis may be appropriate for certain patients, as well as homocysteine levels.    The expression of Factor V Leiden thrombophilia is impacted by coexisting genetic thrombophilic disorders, acquired thrombophilic disorders (malignancy, hyperhomocysteinemia, high factor VIII levels), and circumstances including: pregnancy, oral contraceptive use, hormone replacement therapy, selective estrogen receptor modulators, travel, central venous catheters, surgery, transplantation and advanced age.    In order to derive the most meaningful benefit from this testing, it may be necessary that the results and subsequent options from these complex genetic tests be discussed with patients by a trained genetic professional.    aPTT [[id number]]  (Abnormal) Collected: 12/06/24 0454    Specimen: Blood Updated: 12/06/24 0522     PTT 71.4 seconds     aPTT [[id number]]   (Abnormal) Collected: 12/05/24 1942    Specimen: Blood Updated: 12/05/24 2118     .0 seconds     CBC & Differential [[id number]]  (Abnormal) Collected: 12/05/24 0256    Specimen: Blood from Arm, Right Updated: 12/05/24 0303    Narrative:      The following orders were created for panel order CBC & Differential.  Procedure                               Abnormality         Status                     ---------                               -----------         ------                     CBC Auto Differential[[id number]]        Abnormal            Final result                 Please view results for these tests on the individual orders.    CBC Auto Differential [[id number]]  (Abnormal) Collected: 12/05/24 0256    Specimen: Blood from Arm, Right Updated: 12/05/24 0303     WBC 4.43 10*3/mm3      RBC 4.73 10*6/mm3      Hemoglobin 13.8 g/dL      Hematocrit 40.7 %      MCV 86.0 fL      MCH 29.2 pg      MCHC 33.9 g/dL      RDW 12.8 %      RDW-SD 39.5 fl      MPV 9.8 fL      Platelets 190 10*3/mm3      Neutrophil % 47.2 %      Lymphocyte % 35.4 %      Monocyte % 13.5 %      Eosinophil % 2.5 %      Basophil % 0.9 %      Immature Grans % 0.5 %      Neutrophils, Absolute 2.09 10*3/mm3      Lymphocytes, Absolute 1.57 10*3/mm3      Monocytes, Absolute 0.60 10*3/mm3      Eosinophils, Absolute 0.11 10*3/mm3      Basophils, Absolute 0.04 10*3/mm3      Immature Grans, Absolute 0.02 10*3/mm3      nRBC 0.0 /100 WBC     Protime-INR [[id number]]  (Abnormal) Collected: 12/03/24 1948    Specimen: Blood Updated: 12/03/24 2051     Protime 14.9 Seconds      INR 1.16    High Sensitivity Troponin T 2Hr [[id number]]  (Abnormal) Collected: 12/03/24 1736    Specimen: Blood from Arm, Left Updated: 12/03/24 1759     HS Troponin T 7 ng/L      Troponin T Delta -4 ng/L     Narrative:      High Sensitive Troponin T Reference Range:  <14.0 ng/L- Negative Female for AMI  <22.0 ng/L- Negative Male for AMI  >=14 - Abnormal Female indicating possible  myocardial injury.  >=22 - Abnormal Male indicating possible myocardial injury.   Clinicians would have to utilize clinical acumen, EKG, Troponin, and serial changes to determine if it is an Acute Myocardial Infarction or myocardial injury due to an underlying chronic condition.         Comprehensive Metabolic Panel [[id number]]  (Abnormal) Collected: 12/03/24 1501    Specimen: Blood Updated: 12/03/24 1530     Glucose 88 mg/dL      BUN 11 mg/dL      Creatinine 0.98 mg/dL      Sodium 139 mmol/L      Potassium 3.7 mmol/L      Chloride 101 mmol/L      CO2 27.6 mmol/L      Calcium 9.5 mg/dL      Total Protein 7.4 g/dL      Albumin 4.5 g/dL      ALT (SGPT) 48 U/L      AST (SGOT) 46 U/L      Alkaline Phosphatase 119 U/L      Total Bilirubin 0.4 mg/dL      Globulin 2.9 gm/dL      A/G Ratio 1.6 g/dL      BUN/Creatinine Ratio 11.2     Anion Gap 10.4 mmol/L      eGFR 93.9 mL/min/1.73     Narrative:      GFR Normal >60  Chronic Kidney Disease <60  Kidney Failure <15      High Sensitivity Troponin T [[id number]]  (Normal) Collected: 12/03/24 1501    Specimen: Blood Updated: 12/03/24 1530     HS Troponin T 11 ng/L     Narrative:      High Sensitive Troponin T Reference Range:  <14.0 ng/L- Negative Female for AMI  <22.0 ng/L- Negative Male for AMI  >=14 - Abnormal Female indicating possible myocardial injury.  >=22 - Abnormal Male indicating possible myocardial injury.   Clinicians would have to utilize clinical acumen, EKG, Troponin, and serial changes to determine if it is an Acute Myocardial Infarction or myocardial injury due to an underlying chronic condition.         D-dimer, Quantitative [[id number]]  (Abnormal) Collected: 12/03/24 1501    Specimen: Blood Updated: 12/03/24 1524     D-Dimer, Quantitative 0.75 MCGFEU/mL     Narrative:      According to the assay 's published package insert, a normal (<0.50 MCGFEU/mL) D-dimer result in conjunction with a non-high clinical probability assessment, excludes deep vein  "thrombosis (DVT) and pulmonary embolism (PE) with high sensitivity.    D-dimer values increase with age and this can make VTE exclusion of an older population difficult. To address this, the [employer], based on best available evidence and recent guidelines, recommends that clinicians use age-adjusted D-dimer thresholds in patients greater than 50 years of age with: a) a low probability of PE who do not meet all Pulmonary Embolism Rule Out Criteria, or b) in those with intermediate probability of PE.   The formula for an age-adjusted D-dimer cut-off is \"age/100\".  For example, a 60 year old patient would have an age-adjusted cut-off of 0.60 MCGFEU/mL and an 80 year old 0.80 MCGFEU/mL.    CBC & Differential [[id number]]  (Abnormal) Collected: 12/03/24 1501    Specimen: Blood Updated: 12/03/24 1508    Narrative:      The following orders were created for panel order CBC & Differential.  Procedure                               Abnormality         Status                     ---------                               -----------         ------                     CBC Auto Differential[[id number]]        Abnormal            Final result                 Please view results for these tests on the individual orders.    CBC Auto Differential [[id number]]  (Abnormal) Collected: 12/03/24 1501    Specimen: Blood Updated: 12/03/24 1508     WBC 4.31 10*3/mm3      RBC 4.87 10*6/mm3      Hemoglobin 14.8 g/dL      Hematocrit 41.6 %      MCV 85.4 fL      MCH 30.4 pg      MCHC 35.6 g/dL      RDW 12.6 %      RDW-SD 39.1 fl      MPV 9.8 fL      Platelets 211 10*3/mm3      Neutrophil % 46.7 %      Lymphocyte % 31.8 %      Monocyte % 17.2 %      Eosinophil % 2.6 %      Basophil % 1.2 %      Immature Grans % 0.5 %      Neutrophils, Absolute 2.02 10*3/mm3      Lymphocytes, Absolute 1.37 10*3/mm3      Monocytes, Absolute 0.74 10*3/mm3      Eosinophils, Absolute 0.11 10*3/mm3      Basophils, Absolute 0.05 10*3/mm3      Immature " Grans, Absolute 0.02 10*3/mm3      nRBC 0.0 /100 WBC              Results for orders placed during the hospital encounter of 12/03/24    Adult Transthoracic Echo Complete W/ Cont if Necessary Per Protocol    Interpretation Summary    Left ventricular ejection fraction appears to be 56 - 60%.    Saline test results are negative.              Condition on Discharge:  stable    Vital Signs  Temp:  [97.8 °F (36.6 °C)-98.4 °F (36.9 °C)] 97.8 °F (36.6 °C)  Heart Rate:  [87-97] 89  Resp:  [14-19] 14  BP: ()/(54-85) 120/85    Physical Exam:     General Appearance:    Alert, cooperative, in no acute distress   Head:    Normocephalic, without obvious abnormality, atraumatic   Eyes:            Lids and lashes normal, conjunctivae and sclerae normal, no   icterus, no pallor, corneas clear, PERRLA   Ears:    Ears appear intact with no abnormalities noted   Throat:   No oral lesions, no thrush, oral mucosa moist   Neck:   No adenopathy, supple, trachea midline, no thyromegaly, no   carotid bruit, no JVD   Lungs:     Clear to auscultation,respirations regular, even and                  unlabored    Heart:    Regular rhythm and normal rate, normal S1 and S2, no            murmur, no gallop, no rub, no click   Chest Wall:    No abnormalities observed   Abdomen:     Normal bowel sounds, no masses, no organomegaly, soft        non-tender, non-distended, no guarding, no rebound                tenderness   Extremities:   Moves all extremities well, no edema, no cyanosis, no             redness   Pulses:   Pulses palpable and equal bilaterally   Skin:   No bleeding, bruising or rash   Lymph nodes:   No palpable adenopathy   Neurologic:   Cranial nerves 2 - 12 grossly intact, sensation intact, DTR       present and equal bilaterally       Discharge Disposition  Home or Self Care    Discharge Medications     Discharge Medications        New Medications        Instructions Start Date   apixaban 5 MG tablet tablet  Commonly known as:  IRA   Take 2 tablets by mouth 2 (Two) Times a Day for 7 days, THEN 1 tablet 2 (Two) Times a Day for 23 days. Indications: DVT/PE (active thrombosis)   Start Date: December 6, 2024            Continue These Medications        Instructions Start Date   amLODIPine 5 MG tablet  Commonly known as: NORVASC   5 mg, Oral, Daily      cetirizine 10 MG tablet  Commonly known as: zyrTEC   10 mg, Daily PRN      rosuvastatin 10 MG tablet  Commonly known as: CRESTOR   10 mg, Daily             Stop These Medications      aspirin 325 MG EC tablet     aspirin 81 MG EC tablet     lisinopril-hydrochlorothiazide 20-25 MG per tablet  Commonly known as: PRINZIDE,ZESTORETIC              Discharge Diet: Regular    Activity at Discharge:   Activity Instructions       Work Restrictions      Excuse from work 12/2-12/6/2024    Type of Restriction: Work    May Return to Work: Specific Date    Return To Work Date: 12/9/2024    With / Without Restrictions: Without Restrictions            Follow-up Appointments  No future appointments.  Additional Instructions for the Follow-ups that You Need to Schedule       Discharge Follow-up with PCP   As directed       Currently Documented PCP:    Gigi Espinal MD    PCP Phone Number:    [phone]     Follow Up Details: Call office to schedule a prompt hospital follow up appointment.        Discharge Follow-up with Specified Provider: Dr. Coleman- hematologist; 3 Months   As directed      To: Dr. Coleman- hematologist   Follow Up: 3 Months        Discharge Follow-up with Specified Provider:  - cardiologist; 1 Month   As directed      To:  - cardiologist   Follow Up: 1 Month                Test Results Pending at Discharge  Pending Results       None             Penny Lee MD  12/06/24  12:38 EST    Time: Discharge 25 min

## 2024-12-06 NOTE — PROGRESS NOTES
Transitions of Care (test claim):    Patient insurance type: Commercial Insurance - they are eligible to use a monthly  discount card in the future  Is patient signed up for M2B? no    Drug Eliquis:   Covered/PA Required: Covered without PA  Copay Per Month: $117.01  Is the medication eligible for a trial card/copay card? Free trial available from  if patient has not used previously and copay card available as well.     Drug Xarelto:   Covered/PA Required: Covered without PA  Copay Per Month: $112.13  Is the medication eligible for a trial card/copay card? Free trial available from  if patient has not used previously and copay card available as well.    For billing questions please call [facility] Pharmacist at ext: [phone]  For M2B questions please call [facility] at ext: [phone]    Lela CastellanosD, BCPS

## 2024-12-06 NOTE — PROGRESS NOTES
LOS: 0 days   Patient Care Team:  Gigi Espinal MD as PCP - General (Family Medicine)  Jose Verde MD as Consulting Physician (Cardiology)    Subjective     Interval History: Stable overnight    Patient Complaints: No complaints.  Denies chest pain    History taken from: patient    Review of Systems        Objective     Vital Signs  Temp:  [97.7 °F (36.5 °C)-99 °F (37.2 °C)] 98.4 °F (36.9 °C)  Heart Rate:  [73-98] 97  Resp:  [12-20] 19  BP: ()/() 149/80    Physical Exam:     General Appearance:    Alert, cooperative, in no acute distress,   Head:    Normocephalic, without obvious abnormality, atraumatic   Eyes:            Lids and lashes normal, conjunctivae and sclerae normal, no   icterus, no pallor, corneas clear, PERRLA   Ears:    Ears appear intact with no abnormalities noted   Throat:   No oral lesions, no thrush, oral mucosa moist   Neck:   No adenopathy, supple, trachea midline, no thyromegaly, no   carotid bruit, no JVD   Lungs:     Clear to auscultation,respirations regular, even and                  unlabored    Heart:    Regular rhythm and normal rate, normal S1 and S2, no            murmur, no gallop, no rub, no click   Chest Wall:    No abnormalities observed   Abdomen:     Normal bowel sounds, no masses, no organomegaly, soft        Non-tender non-distended, no guarding,   Extremities:   Moves all extremities well, no edema, no cyanosis, no             Redness   Pulses:   Pulses palpable and equal bilaterally   Skin:   No bleeding, bruising or rash   Lymph nodes:   No palpable adenopathy   Neurologic:   Cranial nerves 2 - 12 grossly intact, sensation intact, DTR       present and equal bilaterally        Results Review:    Lab Results (last 24 hours)       Procedure Component Value Units Date/Time    aPTT [[id number]]  (Abnormal) Collected: 12/05/24 1243    Specimen: Blood from Arm, Right Updated: 12/05/24 1300     PTT 55.7 seconds     Narrative:      Comm Log not indicated,  patient on heparin.      aPTT [[id number]]  (Abnormal) Collected: 12/05/24 0256    Specimen: Blood from Arm, Right Updated: 12/05/24 0342     PTT >200.0 seconds     CBC & Differential [[id number]]  (Abnormal) Collected: 12/05/24 0256    Specimen: Blood from Arm, Right Updated: 12/05/24 0303    Narrative:      The following orders were created for panel order CBC & Differential.  Procedure                               Abnormality         Status                     ---------                               -----------         ------                     CBC Auto Differential[[id number]]        Abnormal            Final result                 Please view results for these tests on the individual orders.    CBC Auto Differential [[id number]]  (Abnormal) Collected: 12/05/24 0256    Specimen: Blood from Arm, Right Updated: 12/05/24 0303     WBC 4.43 10*3/mm3      RBC 4.73 10*6/mm3      Hemoglobin 13.8 g/dL      Hematocrit 40.7 %      MCV 86.0 fL      MCH 29.2 pg      MCHC 33.9 g/dL      RDW 12.8 %      RDW-SD 39.5 fl      MPV 9.8 fL      Platelets 190 10*3/mm3      Neutrophil % 47.2 %      Lymphocyte % 35.4 %      Monocyte % 13.5 %      Eosinophil % 2.5 %      Basophil % 0.9 %      Immature Grans % 0.5 %      Neutrophils, Absolute 2.09 10*3/mm3      Lymphocytes, Absolute 1.57 10*3/mm3      Monocytes, Absolute 0.60 10*3/mm3      Eosinophils, Absolute 0.11 10*3/mm3      Basophils, Absolute 0.04 10*3/mm3      Immature Grans, Absolute 0.02 10*3/mm3      nRBC 0.0 /100 WBC     aPTT [[id number]]  (Abnormal) Collected: 12/04/24 1923    Specimen: Blood from Arm, Right Updated: 12/04/24 1950     PTT 54.7 seconds              Imaging Results (Last 24 Hours)       ** No results found for the last 24 hours. **                 I reviewed the patient's new clinical results.    Medication Review:   Scheduled Meds:amLODIPine, 5 mg, Oral, Daily  [START ON 12/6/2024] apixaban, 10 mg, Oral, BID   Followed by  [START ON 12/13/2024] apixaban, 5 mg,  Oral, BID  rosuvastatin, 10 mg, Oral, Daily  sodium chloride, 10 mL, Intravenous, Q12H      Continuous Infusions:heparin, 13.4 Units/kg/hr, Last Rate: 17.4 Units/kg/hr (12/05/24 3975)      PRN Meds:.  acetaminophen **OR** acetaminophen **OR** acetaminophen    senna-docusate sodium **AND** polyethylene glycol **AND** bisacodyl **AND** bisacodyl    Calcium Replacement - Follow Nurse / BPA Driven Protocol    cetirizine    diphenhydrAMINE    heparin    heparin    hydrALAZINE    Magnesium Standard Dose Replacement - Follow Nurse / BPA Driven Protocol    nitroglycerin    ondansetron ODT **OR** ondansetron    Phosphorus Replacement - Follow Nurse / BPA Driven Protocol    Potassium Replacement - Follow Nurse / BPA Driven Protocol    [COMPLETED] Insert Peripheral IV **AND** sodium chloride    sodium chloride    sodium chloride     Assessment & Plan       Pulmonary emboli    Pulmonary embolism  Left sided chest pain  HTN  Obesity    Transitioning to oral anticoagulation for small PE.  Factor V Leiden results pending.  Defer further cardiac testing in the setting of PE.  I do not think his left sided chest pain is related to small right PE.  Will control BP and follow up with cardiologist as outpatient.  Will need prn NTG at discharge to use if symptoms of chest pain recur.         Plan for disposition:Home in am    Penny Lee MD  12/05/24  19:05 EST

## 2024-12-06 NOTE — PROGRESS NOTES
Subjective     HISTORY OF PRESENT ILLNESS:     History of Present Illness      12/5/2024: Mr. Lopez came to the hospital shortly after he started to have stabbing pain in the left chest, under the breast, at times associated to dyspnea.  This started suddenly, without an evident provoking factor, while at work.  He did not seem to improve though a dose of acetyl salicylic acid provided some relief.  He underwent some imaging after his D-dimer was elevated.  Questionable pulmonary embolism was documented on a CT with PE protocol.  No evidence of deep vein thrombosis of the lower extremities.  He has no significant past history.  He was a smoker for a while but has been abstinent now for several years.  He also used to drink heavily but not recently.  He receives treatment for hypertension and hypercholesterolemia.  On exam he is a chronically ill-appearing man.  No distress.  No jaundice or pallor.  Very poor dentition but no oral lesions.  No jugular vein distention.  Lungs are diminished bilaterally and heart is regular and tachycardic.  Abdomen is protuberant.  There is no edema.  Reviewed the images and the report of the scans.  Reviewed the laboratory exams. Unsure he has a pulmonary embolism. Will start apixaban and obtain a V/Q scan.     12/6/2024: Today feeling about the same. No more dyspnea than before. No chest pain. Able to eat. Wants to go home.     Past Medical History, Past Surgical History, Social History, Family History have been reviewed and are without significant changes except as mentioned.    Review of Systems   A comprehensive 14 point review of systems was performed and was negative except as mentioned.    Medications:  The current medication list was reviewed in the EMR    ALLERGIES:  No Known Allergies    Objective      There were no vitals filed for this visit.       No data to display                Physical Exam      Alert, conversant and in no distress. Appears chronically ill.   No  jaundice or pallor.   Lungs diminished bilaterally to auscultation  Heart regular. Abdomen protuberant and soft.   No edema.    RECENT LABS:  Hematology WBC   Date Value Ref Range Status   12/05/2024 4.43 3.40 - 10.80 10*3/mm3 Final     RBC   Date Value Ref Range Status   12/05/2024 4.73 4.14 - 5.80 10*6/mm3 Final     Hemoglobin   Date Value Ref Range Status   12/05/2024 13.8 13.0 - 17.7 g/dL Final     Hematocrit   Date Value Ref Range Status   12/05/2024 40.7 37.5 - 51.0 % Final     Platelets   Date Value Ref Range Status   12/05/2024 190 140 - 450 10*3/mm3 Final              Assessment & Plan   Pulmonary embolism? Likely not. The CT angiogram was equivocal on the report. He has no suggestion of deep vein thrombosis. The V/Q scan was negative. Will keep on anticoagulation for e months. Will follow as outpatient if discharged home today.   Factor V Leiden variant heterozygosity.   Discussed with him at length.     12/6/2024      CC:

## 2024-12-17 ENCOUNTER — TELEPHONE (OUTPATIENT)
Dept: CARDIOLOGY | Facility: CLINIC | Age: 49
End: 2024-12-17
Payer: COMMERCIAL

## 2024-12-17 NOTE — TELEPHONE ENCOUNTER
Caller: Robby Lopez    Relationship: Self    Best call back number: [phone]      PATIENT IS NEEDING A HOSPITAL FOLLOW UP (1.5.25)    HE CAN ONLY COME ON FRIDAYS AFTER 11

## 2024-12-17 NOTE — TELEPHONE ENCOUNTER
HUB UNABLE TO WARM TRANSFER  Caller: Robby Lopez    Relationship to patient: Self    Best call back number: [phone]    Patient is needing: PATIENT REQUESTING A CALL BACK AROUND 4:15PM

## 2024-12-31 NOTE — PROGRESS NOTES
Subjective:     Encounter Date:01/03/2025      Patient ID: Robby Lopez is a 49 y.o. male.    Chief Complaint:  History of Present Illness    Robby Lopez is a 49 y.o. male who presents to the office today for hospital discharge follow-up for which cardiology evaluated for shortness of breath/chest pain.  Patient had elevated D-dimer and CTA revealed possible small subsegmental right-sided pulmonary embolism.  He was started on heparin drip.  VQ scan completed showing low probability for PE.  Bilateral lower extremity duplex without evidence of DVT.  He was discharged on Eliquis and advised to follow with hematology due to heterozygous factor V Leiden.  Echocardiogram revealed normal LVEF with no significant valvular abnormalities.   Patient seen and examined, labs and chart reviewed. Patient states he is doing well from a cardiology standpoint as he denies chest pain, shortness of breath, fatigue/weakness, palpitations, lightheadedness/dizziness, edema, nausea/vomiting, syncope.  He has had no episodes of chest pain since hospitalization.  At time of hospitalization troponin negative, echocardiogram with normal LVEF, EKG without ST T-segment changes.  No need for further ischemic workup at this time.  He is currently not taking his statin therapy as he ran out and just needs to  a new prescription.  His vital signs are stable today.       The following portions of the patient's history were reviewed and updated as appropriate: allergies, current medications, past family history, past medical history, past social history, past surgical history, and problem list.    Past Medical History:   Diagnosis Date    Hyperlipidemia     Hypertension     Seasonal allergies     Tendonitis of elbow, right      Past Surgical History:   Procedure Laterality Date    EAR TUBES      FACIAL FRACTURE SURGERY  2019    ORBITAL FRACTURE OPEN REDUCTION INTERNAL FIXATION      TONSILLECTOMY       /85   Pulse 82   Ht 170.2 cm  "(67.01\")   Wt 107 kg (235 lb)   SpO2 99%   BMI 36.80 kg/m²   Family History   Problem Relation Age of Onset    No Known Problems Mother     Heart attack Father     No Known Problems Sister     No Known Problems Brother     No Known Problems Maternal Aunt     No Known Problems Maternal Uncle     No Known Problems Paternal Aunt     No Known Problems Paternal Uncle     Stroke Maternal Grandmother     Heart attack Maternal Grandfather     No Known Problems Paternal Grandmother     No Known Problems Paternal Grandfather     No Known Problems Other     Anemia Neg Hx     Arrhythmia Neg Hx     Asthma Neg Hx     Clotting disorder Neg Hx     Fainting Neg Hx     Heart disease Neg Hx     Heart failure Neg Hx     Hyperlipidemia Neg Hx     Hypertension Neg Hx        Current Outpatient Medications:     amLODIPine (NORVASC) 5 MG tablet, Take 1 tablet by mouth Daily., Disp: 90 tablet, Rfl: 3    apixaban (ELIQUIS) 5 MG tablet tablet, Take 2 tablets by mouth 2 (Two) Times a Day for 7 days, THEN 1 tablet 2 (Two) Times a Day for 23 days. Indications: DVT/PE (active thrombosis), Disp: 74 tablet, Rfl: 0    cetirizine (zyrTEC) 10 MG tablet, Take 1 tablet by mouth Daily As Needed for Allergies., Disp: , Rfl:     rosuvastatin (CRESTOR) 10 MG tablet, Take 1 tablet by mouth Daily. (Patient not taking: Reported on 1/3/2025), Disp: , Rfl:     No Known Allergies    Social History     Socioeconomic History    Marital status: Single   Tobacco Use    Smoking status: Former     Current packs/day: 0.00     Average packs/day: 1 pack/day for 14.0 years (14.0 ttl pk-yrs)     Types: Cigarettes     Start date:      Quit date: 2004     Years since quittin.0     Passive exposure: Past    Smokeless tobacco: Never   Vaping Use    Vaping status: Former   Substance and Sexual Activity    Alcohol use: Yes     Alcohol/week: 4.0 - 5.0 standard drinks of alcohol     Types: 4 - 5 Cans of beer per week     Comment: OCC.    Drug use: Never    Sexual " activity: Defer     Review of Systems   Constitutional: Negative for malaise/fatigue.   Cardiovascular:  Negative for chest pain, dyspnea on exertion, leg swelling and palpitations.   Respiratory:  Negative for cough and shortness of breath.    Gastrointestinal:  Negative for abdominal pain, nausea and vomiting.   Neurological:  Negative for dizziness, focal weakness, headaches, light-headedness and numbness.   All other systems reviewed and are negative.             Objective:     Vitals reviewed.   Constitutional:       Appearance: Not in distress. Obese.   Eyes:      Pupils: Pupils are equal, round, and reactive to light.   HENT:      Nose: Nose normal.    Mouth/Throat:      Pharynx: Oropharynx is clear.   Pulmonary:      Effort: Pulmonary effort is normal.      Breath sounds: Normal breath sounds.   Cardiovascular:      Normal rate. Regular rhythm.   Pulses:     Intact distal pulses.   Edema:     Peripheral edema absent.   Abdominal:      Palpations: Abdomen is soft.   Musculoskeletal: Normal range of motion.      Cervical back: Normal range of motion and neck supple. Skin:     General: Skin is warm and dry.   Neurological:      General: No focal deficit present.      Mental Status: Alert and oriented to person, place and time.       Procedures    Lab Review:    Diagnosis Plan   1. Hospital discharge follow-up        2. Essential hypertension        3. Dyslipidemia        4. Factor V Leiden carrier        5. Class 2 obesity with alveolar hypoventilation without serious comorbidity with body mass index (BMI) of 36.0 to 36.9 in adult        LAB RESULTS (LAST 7 DAYS)    Echocardiogram   Results for orders placed during the hospital encounter of 12/03/24    Adult Transthoracic Echo Complete W/ Cont if Necessary Per Protocol    Interpretation Summary    Left ventricular ejection fraction appears to be 56 - 60%.    Saline test results are negative.      CBC        BMP        CMP         BNP        TROPONIN         CoAg        Creatinine Clearance  CrCl cannot be calculated (Patient's most recent lab result is older than the maximum 30 days allowed.).    ABG        Radiology  No radiology results for the last day        MDM    Hospital discharge follow-up  Heterozygous factor V Leiden  Patient recently presented to [facility] with chest pain, shortness of breath  D-dimer elevated  CT revealed questionable small subsegmental right pulmonary embolus  VQ scan with low probability of PE  Bilateral lower extremity duplex negative for DVT  Eliquis 5 mg  Plans to follow with hematology    Hypertension  Blood pressure 132/85  Norvasc 5 mg    Dyslipidemia  Statin therapy  Managed by PCP    Obesity  BMI 36.80  Diet lifestyle recommendations discussed    Atypical chest pain  Patient presented with atypical chest pain during hospitalization  Troponin negative  EKG without acute ST-T segment changes  Echocardiogram with normal LVEF  No need for further ischemic workup at this time  Consider Myoview study if further episodes of chest pain    Patient's previous medical records, labs, and EKG were reviewed and discussed with the patient at today's visit.     Electronically signed by MOLLY Franco, 01/03/25, 10:44 AM EST.

## 2025-01-03 ENCOUNTER — OFFICE VISIT (OUTPATIENT)
Dept: CARDIOLOGY | Facility: CLINIC | Age: 50
End: 2025-01-03
Payer: COMMERCIAL

## 2025-01-03 VITALS
BODY MASS INDEX: 36.88 KG/M2 | WEIGHT: 235 LBS | SYSTOLIC BLOOD PRESSURE: 132 MMHG | HEIGHT: 67 IN | DIASTOLIC BLOOD PRESSURE: 85 MMHG | OXYGEN SATURATION: 99 % | HEART RATE: 82 BPM

## 2025-01-03 DIAGNOSIS — E66.812 CLASS 2 OBESITY WITH ALVEOLAR HYPOVENTILATION WITHOUT SERIOUS COMORBIDITY WITH BODY MASS INDEX (BMI) OF 36.0 TO 36.9 IN ADULT: ICD-10-CM

## 2025-01-03 DIAGNOSIS — D68.51 FACTOR V LEIDEN CARRIER: ICD-10-CM

## 2025-01-03 DIAGNOSIS — E78.5 DYSLIPIDEMIA: ICD-10-CM

## 2025-01-03 DIAGNOSIS — E66.2 CLASS 2 OBESITY WITH ALVEOLAR HYPOVENTILATION WITHOUT SERIOUS COMORBIDITY WITH BODY MASS INDEX (BMI) OF 36.0 TO 36.9 IN ADULT: ICD-10-CM

## 2025-01-03 DIAGNOSIS — I10 ESSENTIAL HYPERTENSION: ICD-10-CM

## 2025-01-03 DIAGNOSIS — Z09 HOSPITAL DISCHARGE FOLLOW-UP: Primary | ICD-10-CM

## 2025-01-09 ENCOUNTER — APPOINTMENT (OUTPATIENT)
Dept: CT IMAGING | Facility: HOSPITAL | Age: 50
End: 2025-01-09
Payer: COMMERCIAL

## 2025-01-09 ENCOUNTER — HOSPITAL ENCOUNTER (EMERGENCY)
Facility: HOSPITAL | Age: 50
Discharge: HOME | End: 2025-01-09
Attending: EMERGENCY MEDICINE
Payer: COMMERCIAL

## 2025-01-09 VITALS
OXYGEN SATURATION: 98 % | SYSTOLIC BLOOD PRESSURE: 134 MMHG | WEIGHT: 248.68 LBS | DIASTOLIC BLOOD PRESSURE: 65 MMHG | HEIGHT: 67 IN | RESPIRATION RATE: 18 BRPM | BODY MASS INDEX: 39.03 KG/M2 | TEMPERATURE: 97.7 F | HEART RATE: 86 BPM

## 2025-01-09 DIAGNOSIS — S09.90XA CLOSED HEAD INJURY, INITIAL ENCOUNTER: Primary | ICD-10-CM

## 2025-01-09 LAB
HOLD SPECIMEN: NORMAL
HOLD SPECIMEN: NORMAL
WHOLE BLOOD HOLD COAG: NORMAL
WHOLE BLOOD HOLD SPECIMEN: NORMAL

## 2025-01-09 PROCEDURE — 25010000002 METOCLOPRAMIDE PER 10 MG: Performed by: EMERGENCY MEDICINE

## 2025-01-09 PROCEDURE — 96374 THER/PROPH/DIAG INJ IV PUSH: CPT

## 2025-01-09 PROCEDURE — 72125 CT NECK SPINE W/O DYE: CPT

## 2025-01-09 PROCEDURE — 99284 EMERGENCY DEPT VISIT MOD MDM: CPT

## 2025-01-09 PROCEDURE — 70450 CT HEAD/BRAIN W/O DYE: CPT

## 2025-01-09 RX ORDER — METOCLOPRAMIDE HYDROCHLORIDE 5 MG/ML
10 INJECTION INTRAMUSCULAR; INTRAVENOUS ONCE
Status: COMPLETED | OUTPATIENT
Start: 2025-01-09 | End: 2025-01-09

## 2025-01-09 RX ORDER — METOCLOPRAMIDE 5 MG/1
5 TABLET ORAL 3 TIMES DAILY PRN
Qty: 10 TABLET | Refills: 0 | Status: SHIPPED | OUTPATIENT
Start: 2025-01-09

## 2025-01-09 RX ADMIN — METOCLOPRAMIDE 10 MG: 5 INJECTION, SOLUTION INTRAMUSCULAR; INTRAVENOUS at 18:58

## 2025-01-09 NOTE — Clinical Note
[facility] EMERGENCY DEPARTMENT  [address] [place] [address]  Phone: [phone]    Robby Lopez was seen and treated in our emergency department on 1/9/2025.  He may return to work on 01/13/2025.         Thank you for choosing [facility].    Klaus Hernandez MD

## 2025-01-10 NOTE — ED PROVIDER NOTES
Subjective   History of Present Illness  Patient is a 49-year-old male with a history of anticoagulation for prior pulmonary embolism who presents with a headache and neck pain following a fall on ice yesterday. The patient hoped his headache would improve but reports persistent sharp pain in the back of his head and dull pain in the front of his head. Additionally, he experiences mild diffuse neck pain. He denies any numbness, weakness, shortness of breath, lower back pain, chest pain, abdominal pain, or extremity pain.  Review of Systems  See HPI  Past Medical History:   Diagnosis Date    Hyperlipidemia     Hypertension     Seasonal allergies     Tendonitis of elbow, right        No Known Allergies    Past Surgical History:   Procedure Laterality Date    EAR TUBES      FACIAL FRACTURE SURGERY  2019    ORBITAL FRACTURE OPEN REDUCTION INTERNAL FIXATION      TONSILLECTOMY         Family History   Problem Relation Age of Onset    No Known Problems Mother     Heart attack Father     No Known Problems Sister     No Known Problems Brother     No Known Problems Maternal Aunt     No Known Problems Maternal Uncle     No Known Problems Paternal Aunt     No Known Problems Paternal Uncle     Stroke Maternal Grandmother     Heart attack Maternal Grandfather     No Known Problems Paternal Grandmother     No Known Problems Paternal Grandfather     No Known Problems Other     Anemia Neg Hx     Arrhythmia Neg Hx     Asthma Neg Hx     Clotting disorder Neg Hx     Fainting Neg Hx     Heart disease Neg Hx     Heart failure Neg Hx     Hyperlipidemia Neg Hx     Hypertension Neg Hx        Social History     Socioeconomic History    Marital status: Single   Tobacco Use    Smoking status: Former     Current packs/day: 0.00     Average packs/day: 1 pack/day for 14.0 years (14.0 ttl pk-yrs)     Types: Cigarettes     Start date:      Quit date: 2004     Years since quittin.0     Passive exposure: Past    Smokeless tobacco: Never  "  Vaping Use    Vaping status: Former   Substance and Sexual Activity    Alcohol use: Yes     Alcohol/week: 4.0 - 5.0 standard drinks of alcohol     Types: 4 - 5 Cans of beer per week     Comment: OCC.    Drug use: Never    Sexual activity: Defer           Objective   Physical Exam  No acute distress. Normocephalic. No scleral icterus. Moist oral mucosa. No observable neck masses on external visualization. No respiratory distress. No tachypnea or increased work of breathing. Normal heart rate. Intact distal pulses. Abdomen soft and nontender without peritoneal signs. Alert and oriented times 4 with an elevated body mass index. Moving all extremities. No Long's sign, no raccoon eyes. No bony point midline cervical spinal tenderness to palpation, no extraordinary pain. Normal speech.  Procedures           ED Course      /65   Pulse 86   Temp 97.7 °F (36.5 °C)   Resp 18   Ht 170.2 cm (67\")   Wt 113 kg (248 lb 10.9 oz)   SpO2 98%   BMI 38.95 kg/m²   CT Head Without Contrast   Final Result   Impression:   1.No acute intracranial findings. There is no skull fracture.   2.Previous left orbital reconstruction.   3.No acute fracture or dislocation of the cervical spine.   4.There is no bony canal or neural foraminal stenosis. It is difficult to exclude a posttraumatic disc extrusion.            Electronically Signed: Trenton Villarreal MD     1/9/2025 7:39 PM EST     Workstation ID: [id number]      CT Cervical Spine Without Contrast   Final Result   Impression:   1.No acute intracranial findings. There is no skull fracture.   2.Previous left orbital reconstruction.   3.No acute fracture or dislocation of the cervical spine.   4.There is no bony canal or neural foraminal stenosis. It is difficult to exclude a posttraumatic disc extrusion.            Electronically Signed: Trenton Villarreal MD     1/9/2025 7:39 PM EST     Workstation ID: [id number]                                                         Medical Decision " Making  My interpretation of CT head negative for subdural epidural hematoma.  See system for radiology interpretation.    Reassuring exam.  Patient nontoxic-appearing.  Has had vision changes but states he does not have any currently.  Headache improved.  Suspect postconcussive symptoms.  DC'd home.  Return ER precautions discussed.    Final diagnoses:   Closed head injury, initial encounter       ED Disposition  ED Disposition       ED Disposition   Discharge    Condition   Stable    Comment   --               Gigi Espinal MD  [address] [facility] [place] [address] [phone]    In 3 days           Medication List        New Prescriptions      metoclopramide 5 MG tablet  Commonly known as: REGLAN  Take 1 tablet by mouth 3 (Three) Times a Day As Needed (headache).               Where to Get Your Medications        These medications were sent to [facility] [id number] - [facility] [place] - [address] - [phone]  - [phone] FX  [address] [place] [address]      Phone: [phone]   metoclopramide 5 MG tablet            Klaus Hernandez MD  01/10/25 [id number]

## 2025-03-11 NOTE — PROGRESS NOTES
HEMATOLOGY ONCOLOGY OUTPATIENT FOLLOW UP       Patient name: Robby Lopez  : 1975  MRN: [id number]  Primary Care Physician: Gigi Espinal MD  Referring Physician: Gigi Espinal MD  Reason For Consult:         History of Present Illness:  2024: Mr. Lopez came to the hospital shortly after he started to have stabbing pain in the left chest, under the breast, at times associated to dyspnea.  This started suddenly, without an evident provoking factor, while at work.  He did not seem to improve though a dose of acetyl salicylic acid provided some relief.  He underwent some imaging after his D-dimer was elevated.  Questionable pulmonary embolism was documented on a CT with PE protocol.  No evidence of deep vein thrombosis of the lower extremities.  He has no significant past history.  He was a smoker for a while but has been abstinent now for several years.  He also used to drink heavily but not recently.  He receives treatment for hypertension and hypercholesterolemia.  On exam he is a chronically ill-appearing man.  No distress.  No jaundice or pallor.  Very poor dentition but no oral lesions.  No jugular vein distention.  Lungs are diminished bilaterally and heart is regular and tachycardic.  Abdomen is protuberant.  There is no edema.  Reviewed the images and the report of the scans.  Reviewed the laboratory exams. Unsure he has a pulmonary embolism. Will start apixaban and obtain a V/Q scan.      2024: Today feeling about the same. No more dyspnea than before. No chest pain. Able to eat. Wants to go home.     3/14/2025: For follow-up.  Back to normal.  No more chest pains, dyspnea or cough.  He also has had no edema of the lower extremities.  He returned full-time to his job and has had no new limitations.  He is also eating well and has not had any weight loss.  He remains abstinent from smoking.  He has been free of chest pains or cough and has had no abdominal pain, diarrhea or dysuria.   On exam alert and oriented.  Conversant.  No distress.  No jaundice.  The lungs are diminished bilaterally and the heart regular.  The abdomen is protuberant and soft.  No edema.  Laboratory exams reviewed and discussed with him.  Given his history, despite my doubts of pulmonary embolism I believe that longer anticoagulation is reasonable.  His symptoms are definitely concerning.  On this basis I have asked him to continue with the same anticoagulant and sent new prescriptions.  I also asked him to see me in approximately 3 months.     Past Medical History:   Diagnosis Date    Hyperlipidemia     Hypertension     Seasonal allergies     Tendonitis of elbow, right      Past Surgical History:   Procedure Laterality Date    EAR TUBES      FACIAL FRACTURE SURGERY  2019    ORBITAL FRACTURE OPEN REDUCTION INTERNAL FIXATION      TONSILLECTOMY         Current Outpatient Medications:     cetirizine (zyrTEC) 10 MG tablet, Take 1 tablet by mouth Daily As Needed for Allergies., Disp: , Rfl:     lisinopril-hydrochlorothiazide (PRINZIDE,ZESTORETIC) 10-12.5 MG per tablet, Take 1 tablet by mouth Daily., Disp: , Rfl:     rosuvastatin (CRESTOR) 10 MG tablet, Take 1 tablet by mouth Daily., Disp: , Rfl:     apixaban (ELIQUIS) 5 MG tablet tablet, Take 1 tablet by mouth 2 (Two) Times a Day., Disp: 60 tablet, Rfl: 6    No Known Allergies    Family History   Problem Relation Age of Onset    No Known Problems Mother     Heart attack Father     No Known Problems Sister     No Known Problems Brother     No Known Problems Maternal Aunt     No Known Problems Maternal Uncle     No Known Problems Paternal Aunt     No Known Problems Paternal Uncle     Stroke Maternal Grandmother     Heart attack Maternal Grandfather     No Known Problems Paternal Grandmother     No Known Problems Paternal Grandfather     No Known Problems Other     Anemia Neg Hx     Arrhythmia Neg Hx     Asthma Neg Hx     Clotting disorder Neg Hx     Fainting Neg Hx     Heart  disease Neg Hx     Heart failure Neg Hx     Hyperlipidemia Neg Hx     Hypertension Neg Hx      Cancer-related family history is not on file.    Social History     Tobacco Use    Smoking status: Former     Current packs/day: 0.00     Average packs/day: 1 pack/day for 14.0 years (14.0 ttl pk-yrs)     Types: Cigarettes     Start date:      Quit date:      Years since quittin.2     Passive exposure: Past    Smokeless tobacco: Never   Vaping Use    Vaping status: Former   Substance Use Topics    Alcohol use: Yes     Alcohol/week: 4.0 - 5.0 standard drinks of alcohol     Types: 4 - 5 Cans of beer per week     Comment: OCC.    Drug use: Never     Social History     Social History Narrative    Not on file      ROS:   Review of Systems   Constitutional:  Negative for activity change, appetite change, chills, diaphoresis, fatigue, fever and unexpected weight change.   HENT:  Negative for congestion, dental problem, drooling, ear discharge, ear pain, facial swelling, hearing loss, mouth sores, nosebleeds, postnasal drip, rhinorrhea, sinus pressure, sinus pain, sneezing, sore throat, tinnitus, trouble swallowing and voice change.    Eyes:  Negative for photophobia, pain, discharge, redness, itching and visual disturbance.   Respiratory:  Negative for apnea, cough, choking, chest tightness, shortness of breath, wheezing and stridor.    Cardiovascular:  Negative for chest pain, palpitations and leg swelling.   Gastrointestinal:  Negative for abdominal distention, abdominal pain, anal bleeding, blood in stool, constipation, diarrhea, nausea, rectal pain and vomiting.   Endocrine: Negative for cold intolerance, heat intolerance, polydipsia and polyuria.   Genitourinary:  Negative for decreased urine volume, difficulty urinating, dysuria, flank pain, frequency, genital sores, hematuria and urgency.   Musculoskeletal:  Negative for arthralgias, back pain, gait problem, joint swelling, myalgias, neck pain and neck  "stiffness.   Skin:  Negative for color change, pallor and rash.   Neurological:  Negative for dizziness, tremors, seizures, syncope, facial asymmetry, speech difficulty, weakness, light-headedness, numbness and headaches.   Hematological:  Negative for adenopathy. Does not bruise/bleed easily.   Psychiatric/Behavioral:  Negative for agitation, behavioral problems, confusion, decreased concentration, hallucinations, self-injury, sleep disturbance and suicidal ideas. The patient is not nervous/anxious.        Objective:  Vital signs:  Vitals:    03/14/25 1348   BP: 128/78   Pulse: 95   Resp: 16   Temp: 98.1 °F (36.7 °C)   SpO2: 98%   Weight: 107 kg (236 lb 12.8 oz)   Height: 170.2 cm (67\")   PainSc: 0-No pain     Body mass index is 37.09 kg/m².  ECOG  (0) Fully active, able to carry on all predisease performance without restriction    Physical Exam:   Physical Exam  Constitutional:       General: He is not in acute distress.     Appearance: He is not ill-appearing, toxic-appearing or diaphoretic.   HENT:      Head: Normocephalic and atraumatic.      Right Ear: External ear normal.      Left Ear: External ear normal.      Nose: Nose normal.      Mouth/Throat:      Mouth: Mucous membranes are moist.      Pharynx: Oropharynx is clear.   Eyes:      General: No scleral icterus.        Right eye: No discharge.         Left eye: No discharge.      Conjunctiva/sclera: Conjunctivae normal.      Pupils: Pupils are equal, round, and reactive to light.   Cardiovascular:      Rate and Rhythm: Normal rate and regular rhythm.      Pulses: Normal pulses.      Heart sounds: Normal heart sounds. No murmur heard.     No friction rub. No gallop.   Pulmonary:      Effort: No respiratory distress.      Breath sounds: No stridor. No wheezing, rhonchi or rales.      Comments: Clear but diminished bilaterally.  Chest:      Chest wall: No tenderness.   Abdominal:      General: Bowel sounds are normal. There is no distension.      Palpations: " Abdomen is soft. There is no mass.      Tenderness: There is no abdominal tenderness. There is no right CVA tenderness, left CVA tenderness, guarding or rebound.      Comments: Protuberant, soft and nontender.   Musculoskeletal:         General: No tenderness, deformity or signs of injury.      Cervical back: No rigidity.      Right lower leg: No edema.      Left lower leg: No edema.   Lymphadenopathy:      Cervical: No cervical adenopathy.   Skin:     General: Skin is warm and dry.      Coloration: Skin is not jaundiced or pale.      Findings: No bruising or rash.   Neurological:      General: No focal deficit present.      Mental Status: He is alert and oriented to person, place, and time.      Cranial Nerves: No cranial nerve deficit.   Psychiatric:         Mood and Affect: Mood normal.         Behavior: Behavior normal.         Thought Content: Thought content normal.         Judgment: Judgment normal.     CRISTINE Coleman MD performed the physical exam on 3/14/2025 as documented above.    Lab Results - Last 18 Months   Lab Units 03/14/25  1319 12/05/24  0256 12/03/24  1501   WBC 10*3/mm3 5.08 4.43 4.31   HEMOGLOBIN g/dL 15.1 13.8 14.8   HEMATOCRIT % 43.7 40.7 41.6   PLATELETS 10*3/mm3 269 190 211   MCV fL 84.7 86.0 85.4     Lab Results - Last 18 Months   Lab Units 12/03/24  1501 07/11/24  1928   SODIUM mmol/L 139 140   POTASSIUM mmol/L 3.7 3.7   CHLORIDE mmol/L 101 103   CO2 mmol/L 27.6 27.0   BUN mg/dL 11 16   CREATININE mg/dL 0.98 0.99   CALCIUM mg/dL 9.5 9.2   BILIRUBIN mg/dL 0.4  --    ALK PHOS U/L 119*  --    ALT (SGPT) U/L 48*  --    AST (SGOT) U/L 46*  --    GLUCOSE mg/dL 88 102*     Lab Results   Component Value Date    GLUCOSE 88 12/03/2024    BUN 11 12/03/2024    CREATININE 0.98 12/03/2024    EGFRIFNONA 90 05/09/2020    BCR 11.2 12/03/2024    K 3.7 12/03/2024    CO2 27.6 12/03/2024    CALCIUM 9.5 12/03/2024    ALBUMIN 4.5 12/03/2024    AST 46 (H) 12/03/2024    ALT 48 (H) 12/03/2024     Lab Results -  Last 18 Months   Lab Units 12/06/24  0454 12/05/24  1942 12/05/24  1243 12/04/24  0433 12/03/24 1948   INR   --   --   --   --  1.16*   APTT seconds 71.4* 142.0* 55.7*   < > 29.0    < > = values in this interval not displayed.     Lab Results   Component Value Date    PTT 71.4 (C) 12/06/2024    INR 1.16 (H) 12/03/2024     Assessment & Plan     Assessment & Plan  Pulmonary embolism?  There has been doubt about it.  The CT scan reported questionable embolism.  The VQ scan was entirely normal.  Despite this he had concerning symptoms and an elevated D-dimer.  On this basis longer anticoagulation is reasonable.  Sent new prescriptions for apixaban and asked him to see me in approximately 3 months.  Continue with the same dose.  Factor V Leiden variant heterozygosity.   Reviewed the records from the hospital and all laboratory exams as well as the imaging studies.  Discussed with him.  He is to see me in approximately 3 months.     Jabari Coleman MD on 3/14/2025 at 1731.

## 2025-03-14 ENCOUNTER — OFFICE VISIT (OUTPATIENT)
Dept: ONCOLOGY | Facility: CLINIC | Age: 50
End: 2025-03-14
Payer: COMMERCIAL

## 2025-03-14 ENCOUNTER — APPOINTMENT (OUTPATIENT)
Dept: LAB | Facility: HOSPITAL | Age: 50
End: 2025-03-14
Payer: COMMERCIAL

## 2025-03-14 VITALS
HEIGHT: 67 IN | OXYGEN SATURATION: 98 % | SYSTOLIC BLOOD PRESSURE: 128 MMHG | WEIGHT: 236.8 LBS | RESPIRATION RATE: 16 BRPM | HEART RATE: 95 BPM | TEMPERATURE: 98.1 F | BODY MASS INDEX: 37.17 KG/M2 | DIASTOLIC BLOOD PRESSURE: 78 MMHG

## 2025-03-14 DIAGNOSIS — I26.99 PULMONARY EMBOLISM, UNSPECIFIED CHRONICITY, UNSPECIFIED PULMONARY EMBOLISM TYPE, UNSPECIFIED WHETHER ACUTE COR PULMONALE PRESENT: Primary | ICD-10-CM

## 2025-03-14 DIAGNOSIS — I26.99 ACUTE PULMONARY EMBOLISM, UNSPECIFIED PULMONARY EMBOLISM TYPE, UNSPECIFIED WHETHER ACUTE COR PULMONALE PRESENT: Primary | ICD-10-CM

## 2025-03-14 LAB
BASOPHILS # BLD AUTO: 0.02 10*3/MM3 (ref 0–0.2)
BASOPHILS NFR BLD AUTO: 0.4 % (ref 0–1.5)
DEPRECATED RDW RBC AUTO: 39.3 FL (ref 37–54)
EOSINOPHIL # BLD AUTO: 0.1 10*3/MM3 (ref 0–0.4)
EOSINOPHIL NFR BLD AUTO: 2 % (ref 0.3–6.2)
ERYTHROCYTE [DISTWIDTH] IN BLOOD BY AUTOMATED COUNT: 13 % (ref 12.3–15.4)
HCT VFR BLD AUTO: 43.7 % (ref 37.5–51)
HGB BLD-MCNC: 15.1 G/DL (ref 13–17.7)
HOLD SPECIMEN: NORMAL
HOLD SPECIMEN: NORMAL
LYMPHOCYTES # BLD AUTO: 1.73 10*3/MM3 (ref 0.7–3.1)
LYMPHOCYTES NFR BLD AUTO: 34.1 % (ref 19.6–45.3)
MCH RBC QN AUTO: 29.3 PG (ref 26.6–33)
MCHC RBC AUTO-ENTMCNC: 34.6 G/DL (ref 31.5–35.7)
MCV RBC AUTO: 84.7 FL (ref 79–97)
MONOCYTES # BLD AUTO: 0.65 10*3/MM3 (ref 0.1–0.9)
MONOCYTES NFR BLD AUTO: 12.8 % (ref 5–12)
NEUTROPHILS NFR BLD AUTO: 2.58 10*3/MM3 (ref 1.7–7)
NEUTROPHILS NFR BLD AUTO: 50.7 % (ref 42.7–76)
PLATELET # BLD AUTO: 269 10*3/MM3 (ref 140–450)
PMV BLD AUTO: 10.7 FL (ref 6–12)
RBC # BLD AUTO: 5.16 10*6/MM3 (ref 4.14–5.8)
WBC NRBC COR # BLD AUTO: 5.08 10*3/MM3 (ref 3.4–10.8)

## 2025-03-14 PROCEDURE — 36415 COLL VENOUS BLD VENIPUNCTURE: CPT

## 2025-03-14 PROCEDURE — 85025 COMPLETE CBC W/AUTO DIFF WBC: CPT | Performed by: INTERNAL MEDICINE

## 2025-03-14 RX ORDER — LISINOPRIL AND HYDROCHLOROTHIAZIDE 10; 12.5 MG/1; MG/1
1 TABLET ORAL DAILY
COMMUNITY

## 2025-07-01 ENCOUNTER — APPOINTMENT (OUTPATIENT)
Dept: GENERAL RADIOLOGY | Facility: HOSPITAL | Age: 50
End: 2025-07-01
Payer: COMMERCIAL

## 2025-07-01 ENCOUNTER — HOSPITAL ENCOUNTER (EMERGENCY)
Facility: HOSPITAL | Age: 50
Discharge: HOME | End: 2025-07-01
Attending: EMERGENCY MEDICINE | Admitting: EMERGENCY MEDICINE
Payer: COMMERCIAL

## 2025-07-01 VITALS
OXYGEN SATURATION: 94 % | WEIGHT: 227.07 LBS | SYSTOLIC BLOOD PRESSURE: 131 MMHG | BODY MASS INDEX: 35.64 KG/M2 | HEART RATE: 74 BPM | RESPIRATION RATE: 16 BRPM | TEMPERATURE: 97.9 F | DIASTOLIC BLOOD PRESSURE: 91 MMHG | HEIGHT: 67 IN

## 2025-07-01 DIAGNOSIS — R07.9 CHEST PAIN, UNSPECIFIED TYPE: ICD-10-CM

## 2025-07-01 DIAGNOSIS — I10 HYPERTENSION, UNSPECIFIED TYPE: Primary | ICD-10-CM

## 2025-07-01 LAB
ALBUMIN SERPL-MCNC: 4.4 G/DL (ref 3.5–5.2)
ALBUMIN/GLOB SERPL: 1.6 G/DL
ALP SERPL-CCNC: 88 U/L (ref 39–117)
ALT SERPL W P-5'-P-CCNC: 37 U/L (ref 1–41)
ANION GAP SERPL CALCULATED.3IONS-SCNC: 11.2 MMOL/L (ref 5–15)
AST SERPL-CCNC: 27 U/L (ref 1–40)
BASOPHILS # BLD AUTO: 0.04 10*3/MM3 (ref 0–0.2)
BASOPHILS NFR BLD AUTO: 0.7 % (ref 0–1.5)
BILIRUB SERPL-MCNC: 0.3 MG/DL (ref 0–1.2)
BUN SERPL-MCNC: 11.7 MG/DL (ref 6–20)
BUN/CREAT SERPL: 12.2 (ref 7–25)
CALCIUM SPEC-SCNC: 9.4 MG/DL (ref 8.6–10.5)
CHLORIDE SERPL-SCNC: 105 MMOL/L (ref 98–107)
CO2 SERPL-SCNC: 22.8 MMOL/L (ref 22–29)
CREAT SERPL-MCNC: 0.96 MG/DL (ref 0.76–1.27)
D DIMER PPP FEU-MCNC: 0.29 MCGFEU/ML (ref 0–0.51)
DEPRECATED RDW RBC AUTO: 38.6 FL (ref 37–54)
EGFRCR SERPLBLD CKD-EPI 2021: 95.7 ML/MIN/1.73
EOSINOPHIL # BLD AUTO: 0.16 10*3/MM3 (ref 0–0.4)
EOSINOPHIL NFR BLD AUTO: 3 % (ref 0.3–6.2)
ERYTHROCYTE [DISTWIDTH] IN BLOOD BY AUTOMATED COUNT: 12.8 % (ref 12.3–15.4)
GEN 5 1HR TROPONIN T REFLEX: 10 NG/L
GLOBULIN UR ELPH-MCNC: 2.7 GM/DL
GLUCOSE SERPL-MCNC: 108 MG/DL (ref 65–99)
HCT VFR BLD AUTO: 44.1 % (ref 37.5–51)
HGB BLD-MCNC: 15 G/DL (ref 13–17.7)
IMM GRANULOCYTES # BLD AUTO: 0.02 10*3/MM3 (ref 0–0.05)
IMM GRANULOCYTES NFR BLD AUTO: 0.4 % (ref 0–0.5)
LYMPHOCYTES # BLD AUTO: 1.5 10*3/MM3 (ref 0.7–3.1)
LYMPHOCYTES NFR BLD AUTO: 27.8 % (ref 19.6–45.3)
MCH RBC QN AUTO: 28.8 PG (ref 26.6–33)
MCHC RBC AUTO-ENTMCNC: 34 G/DL (ref 31.5–35.7)
MCV RBC AUTO: 84.6 FL (ref 79–97)
MONOCYTES # BLD AUTO: 0.5 10*3/MM3 (ref 0.1–0.9)
MONOCYTES NFR BLD AUTO: 9.3 % (ref 5–12)
NEUTROPHILS NFR BLD AUTO: 3.17 10*3/MM3 (ref 1.7–7)
NEUTROPHILS NFR BLD AUTO: 58.8 % (ref 42.7–76)
NRBC BLD AUTO-RTO: 0 /100 WBC (ref 0–0.2)
NT-PROBNP SERPL-MCNC: 115 PG/ML (ref 0–900)
PLATELET # BLD AUTO: 230 10*3/MM3 (ref 140–450)
PMV BLD AUTO: 10.7 FL (ref 6–12)
POTASSIUM SERPL-SCNC: 3.6 MMOL/L (ref 3.5–5.2)
PROT SERPL-MCNC: 7.1 G/DL (ref 6–8.5)
RBC # BLD AUTO: 5.21 10*6/MM3 (ref 4.14–5.8)
SODIUM SERPL-SCNC: 139 MMOL/L (ref 136–145)
TROPONIN T NUMERIC DELTA: 2 NG/L
TROPONIN T SERPL HS-MCNC: 8 NG/L
WBC NRBC COR # BLD AUTO: 5.39 10*3/MM3 (ref 3.4–10.8)

## 2025-07-01 PROCEDURE — 71045 X-RAY EXAM CHEST 1 VIEW: CPT

## 2025-07-01 PROCEDURE — 84484 ASSAY OF TROPONIN QUANT: CPT | Performed by: EMERGENCY MEDICINE

## 2025-07-01 PROCEDURE — 85379 FIBRIN DEGRADATION QUANT: CPT | Performed by: EMERGENCY MEDICINE

## 2025-07-01 PROCEDURE — 85025 COMPLETE CBC W/AUTO DIFF WBC: CPT | Performed by: EMERGENCY MEDICINE

## 2025-07-01 PROCEDURE — 83880 ASSAY OF NATRIURETIC PEPTIDE: CPT | Performed by: EMERGENCY MEDICINE

## 2025-07-01 PROCEDURE — 80053 COMPREHEN METABOLIC PANEL: CPT | Performed by: EMERGENCY MEDICINE

## 2025-07-01 PROCEDURE — 36415 COLL VENOUS BLD VENIPUNCTURE: CPT

## 2025-07-01 PROCEDURE — 99284 EMERGENCY DEPT VISIT MOD MDM: CPT

## 2025-07-01 PROCEDURE — 93005 ELECTROCARDIOGRAM TRACING: CPT | Performed by: EMERGENCY MEDICINE

## 2025-07-01 NOTE — ED PROVIDER NOTES
Subjective   History of Present Illness  50-year-old male with elevated blood pressure, mild lightheadedness, intermittent none exertional mild left-sided chest discomfort lasting 1 to 2 minutes, none currently for the past several days presents for evaluation.      Review of Systems   Respiratory:  Negative for shortness of breath.    Cardiovascular:  Positive for chest pain and leg swelling.   Neurological:  Positive for light-headedness. Negative for headaches.       Past Medical History:   Diagnosis Date    Hyperlipidemia     Hypertension     Seasonal allergies     Tendonitis of elbow, right        No Known Allergies    Past Surgical History:   Procedure Laterality Date    EAR TUBES      FACIAL FRACTURE SURGERY  2019    ORBITAL FRACTURE OPEN REDUCTION INTERNAL FIXATION      TONSILLECTOMY         Family History   Problem Relation Age of Onset    No Known Problems Mother     Heart attack Father     No Known Problems Sister     No Known Problems Brother     No Known Problems Maternal Aunt     No Known Problems Maternal Uncle     No Known Problems Paternal Aunt     No Known Problems Paternal Uncle     Stroke Maternal Grandmother     Heart attack Maternal Grandfather     No Known Problems Paternal Grandmother     No Known Problems Paternal Grandfather     No Known Problems Other     Anemia Neg Hx     Arrhythmia Neg Hx     Asthma Neg Hx     Clotting disorder Neg Hx     Fainting Neg Hx     Heart disease Neg Hx     Heart failure Neg Hx     Hyperlipidemia Neg Hx     Hypertension Neg Hx        Social History     Socioeconomic History    Marital status: Single   Tobacco Use    Smoking status: Former     Current packs/day: 0.00     Average packs/day: 1 pack/day for 14.0 years (14.0 ttl pk-yrs)     Types: Cigarettes     Start date:      Quit date:      Years since quittin.5     Passive exposure: Past    Smokeless tobacco: Never   Vaping Use    Vaping status: Former   Substance and Sexual Activity    Alcohol use:  Yes     Alcohol/week: 4.0 - 5.0 standard drinks of alcohol     Types: 4 - 5 Cans of beer per week     Comment: OCC.    Drug use: Never    Sexual activity: Defer           Objective   Physical Exam  Constitutional:       Appearance: Normal appearance.   HENT:      Head: Normocephalic and atraumatic.      Mouth/Throat:      Mouth: Mucous membranes are moist.      Pharynx: Oropharynx is clear.   Eyes:      Extraocular Movements: Extraocular movements intact.      Conjunctiva/sclera: Conjunctivae normal.      Pupils: Pupils are equal, round, and reactive to light.   Cardiovascular:      Rate and Rhythm: Normal rate and regular rhythm.      Heart sounds: Normal heart sounds.   Pulmonary:      Effort: Pulmonary effort is normal.      Breath sounds: Normal breath sounds.   Abdominal:      General: Bowel sounds are normal.      Palpations: Abdomen is soft.   Musculoskeletal:      Comments: Trace symmetric lower extremity pitting edema, calf soft and nontender.   Skin:     General: Skin is warm and dry.      Capillary Refill: Capillary refill takes less than 2 seconds.   Neurological:      General: No focal deficit present.      Mental Status: He is alert and oriented to person, place, and time.   Psychiatric:         Mood and Affect: Mood normal.         Behavior: Behavior normal.         Procedures           ED Course                                                       Medical Decision Making  Patient feels well, asymptomatic at present, low risk for heart score, patient reports chest pain actually gets better when he exerts himself, return precautions reviewed, close follow-up with PCP encouraged    Problems Addressed:  Chest pain, unspecified type: complicated acute illness or injury  Hypertension, unspecified type: complicated acute illness or injury    Amount and/or Complexity of Data Reviewed  External Data Reviewed: ECG.     Details: As below  Labs: ordered.     Details: Troponin negative x 2  Radiology: ordered  and independent interpretation performed.     Details: Negative chest x-ray  ECG/medicine tests: ordered and independent interpretation performed.     Details: EKG interpretation: Sinus rhythm, rate 72, no acute ST change, compared with tracing done on 12/3/2024, no acute interval change seen        Final diagnoses:   Hypertension, unspecified type   Chest pain, unspecified type       ED Disposition  ED Disposition       ED Disposition   Discharge    Condition   Stable    Comment   --               Gigi Espinal MD  [facility] [address]  [phone]    Schedule an appointment as soon as possible for a visit            Medication List      No changes were made to your prescriptions during this visit.            Vishnu Sinha MD  07/01/25 9703

## 2025-07-02 LAB
QT INTERVAL: 382 MS
QTC INTERVAL: 419 MS

## 2025-07-09 NOTE — PROGRESS NOTES
HEMATOLOGY ONCOLOGY OUTPATIENT FOLLOW UP       Patient name: Robby Lopez  : 1975  MRN: [id number]  Primary Care Physician: Gigi Espinal MD  Referring Physician: No ref. provider found  Reason For Consult:         History of Present Illness:  2024: Mr. Lopez came to the hospital shortly after he started to have stabbing pain in the left chest, under the breast, at times associated to dyspnea.  This started suddenly, without an evident provoking factor, while at work.  He did not seem to improve though a dose of acetyl salicylic acid provided some relief.  He underwent some imaging after his D-dimer was elevated.  Questionable pulmonary embolism was documented on a CT with PE protocol.  No evidence of deep vein thrombosis of the lower extremities.  He has no significant past history.  He was a smoker for a while but has been abstinent now for several years.  He also used to drink heavily but not recently.  He receives treatment for hypertension and hypercholesterolemia.  On exam he is a chronically ill-appearing man.  No distress.  No jaundice or pallor.  Very poor dentition but no oral lesions.  No jugular vein distention.  Lungs are diminished bilaterally and heart is regular and tachycardic.  Abdomen is protuberant.  There is no edema.  Reviewed the images and the report of the scans.  Reviewed the laboratory exams. Unsure he has a pulmonary embolism. Will start apixaban and obtain a V/Q scan.      2024: Today feeling about the same. No more dyspnea than before. No chest pain. Able to eat. Wants to go home.     3/14/2025: For follow-up.  Back to normal.  No more chest pains, dyspnea or cough.  He also has had no edema of the lower extremities.  He returned full-time to his job and has had no new limitations.  He is also eating well and has not had any weight loss.  He remains abstinent from smoking.  He has been free of chest pains or cough and has had no abdominal pain, diarrhea or dysuria.   On exam alert and oriented.  Conversant.  No distress.  No jaundice.  The lungs are diminished bilaterally and the heart regular.  The abdomen is protuberant and soft.  No edema.  Laboratory exams reviewed and discussed with him.  Given his history, despite my doubts of pulmonary embolism I believe that longer anticoagulation is reasonable.  His symptoms are definitely concerning.  On this basis I have asked him to continue with the same anticoagulant and sent new prescriptions.  I also asked him to see me in approximately 3 months.    7/11/2025: Feeling reasonably well.  Is not any further chest pain, dyspnea or cough.  Has had no further lower extremity edema.  He is active and has no limitations.  Continues apixaban with good compliance.  No bleeding concerns.  He remains abstinent from smoking.  On exam he is alert and oriented.  No distress.  No jaundice nor pallor.  Lungs clear to auscultation.  Heart regular.  Reviewed laboratory exams.     Past Medical History:   Diagnosis Date    Hyperlipidemia     Hypertension     Seasonal allergies     Tendonitis of elbow, right      Past Surgical History:   Procedure Laterality Date    EAR TUBES      FACIAL FRACTURE SURGERY  2019    ORBITAL FRACTURE OPEN REDUCTION INTERNAL FIXATION      TONSILLECTOMY         Current Outpatient Medications:     apixaban (ELIQUIS) 5 MG tablet tablet, Take 1 tablet by mouth 2 (Two) Times a Day., Disp: 60 tablet, Rfl: 6    cetirizine (zyrTEC) 10 MG tablet, Take 1 tablet by mouth Daily As Needed for Allergies., Disp: , Rfl:     lisinopril-hydrochlorothiazide (PRINZIDE,ZESTORETIC) 10-12.5 MG per tablet, Take 1 tablet by mouth Daily., Disp: , Rfl:     rosuvastatin (CRESTOR) 10 MG tablet, Take 1 tablet by mouth Daily., Disp: , Rfl:     No Known Allergies    Family History   Problem Relation Age of Onset    No Known Problems Mother     Heart attack Father     No Known Problems Sister     No Known Problems Brother     No Known Problems Maternal  Aunt     No Known Problems Maternal Uncle     No Known Problems Paternal Aunt     No Known Problems Paternal Uncle     Stroke Maternal Grandmother     Heart attack Maternal Grandfather     No Known Problems Paternal Grandmother     No Known Problems Paternal Grandfather     No Known Problems Other     Anemia Neg Hx     Arrhythmia Neg Hx     Asthma Neg Hx     Clotting disorder Neg Hx     Fainting Neg Hx     Heart disease Neg Hx     Heart failure Neg Hx     Hyperlipidemia Neg Hx     Hypertension Neg Hx      Cancer-related family history is not on file.    Social History     Tobacco Use    Smoking status: Former     Current packs/day: 0.00     Average packs/day: 1 pack/day for 14.0 years (14.0 ttl pk-yrs)     Types: Cigarettes     Start date:      Quit date:      Years since quittin.5     Passive exposure: Past    Smokeless tobacco: Never   Vaping Use    Vaping status: Former   Substance Use Topics    Alcohol use: Yes     Alcohol/week: 4.0 - 5.0 standard drinks of alcohol     Types: 4 - 5 Cans of beer per week     Comment: OCC.    Drug use: Never     Social History     Social History Narrative    Not on file      ROS:   Review of Systems   Constitutional:  Negative for activity change, appetite change, chills, diaphoresis, fatigue, fever and unexpected weight change.   HENT:  Negative for congestion, dental problem, drooling, ear discharge, ear pain, facial swelling, hearing loss, mouth sores, nosebleeds, postnasal drip, rhinorrhea, sinus pressure, sinus pain, sneezing, sore throat, tinnitus, trouble swallowing and voice change.    Eyes:  Negative for photophobia, pain, discharge, redness, itching and visual disturbance.   Respiratory:  Negative for apnea, cough, choking, chest tightness, shortness of breath, wheezing and stridor.    Cardiovascular:  Negative for chest pain, palpitations and leg swelling.   Gastrointestinal:  Negative for abdominal distention, abdominal pain, anal bleeding, blood in stool,  constipation, diarrhea, nausea, rectal pain and vomiting.   Endocrine: Negative for cold intolerance, heat intolerance, polydipsia and polyuria.   Genitourinary:  Negative for decreased urine volume, difficulty urinating, dysuria, flank pain, frequency, genital sores, hematuria and urgency.   Musculoskeletal:  Negative for arthralgias, back pain, gait problem, joint swelling, myalgias, neck pain and neck stiffness.   Skin:  Negative for color change, pallor and rash.   Neurological:  Negative for dizziness, tremors, seizures, syncope, facial asymmetry, speech difficulty, weakness, light-headedness, numbness and headaches.   Hematological:  Negative for adenopathy. Does not bruise/bleed easily.   Psychiatric/Behavioral:  Negative for agitation, behavioral problems, confusion, decreased concentration, hallucinations, self-injury, sleep disturbance and suicidal ideas. The patient is not nervous/anxious.        Objective:  Vital signs:  There were no vitals filed for this visit.    There is no height or weight on file to calculate BMI.  ECOG  (0) Fully active, able to carry on all predisease performance without restriction    Physical Exam:   Physical Exam  Vitals reviewed.   Constitutional:       Appearance: Normal appearance.   HENT:      Head: Normocephalic and atraumatic.   Eyes:      General: No scleral icterus.     Conjunctiva/sclera: Conjunctivae normal.   Cardiovascular:      Rate and Rhythm: Normal rate and regular rhythm.      Pulses: Normal pulses.      Heart sounds: No murmur heard.  Pulmonary:      Effort: Pulmonary effort is normal.      Breath sounds: Normal breath sounds.   Abdominal:      General: There is no distension.      Palpations: Abdomen is soft. There is no mass.      Tenderness: There is no abdominal tenderness.   Musculoskeletal:         General: No swelling. Normal range of motion.      Cervical back: Normal range of motion and neck supple.   Skin:     General: Skin is warm.      Coloration:  Skin is not jaundiced or pale.      Findings: No bruising, erythema, lesion or rash.   Neurological:      General: No focal deficit present.      Mental Status: He is alert and oriented to person, place, and time.      Motor: No weakness.   Psychiatric:         Mood and Affect: Mood normal.         Behavior: Behavior normal.         Thought Content: Thought content normal.         Judgment: Judgment normal.       Lab Results - Last 18 Months   Lab Units 07/01/25  0444 03/14/25  1319 12/05/24  0256   WBC 10*3/mm3 5.39 5.08 4.43   HEMOGLOBIN g/dL 15.0 15.1 13.8   HEMATOCRIT % 44.1 43.7 40.7   PLATELETS 10*3/mm3 230 269 190   MCV fL 84.6 84.7 86.0     Lab Results - Last 18 Months   Lab Units 07/01/25  0444 12/03/24  1501 07/11/24  1928   SODIUM mmol/L 139 139 140   POTASSIUM mmol/L 3.6 3.7 3.7   CHLORIDE mmol/L 105 101 103   CO2 mmol/L 22.8 27.6 27.0   BUN mg/dL 11.7 11 16   CREATININE mg/dL 0.96 0.98 0.99   CALCIUM mg/dL 9.4 9.5 9.2   BILIRUBIN mg/dL 0.3 0.4  --    ALK PHOS U/L 88 119*  --    ALT (SGPT) U/L 37 48*  --    AST (SGOT) U/L 27 46*  --    GLUCOSE mg/dL 108* 88 102*     Lab Results   Component Value Date    GLUCOSE 108 (H) 07/01/2025    BUN 11.7 07/01/2025    CREATININE 0.96 07/01/2025    EGFRIFNONA 90 05/09/2020    BCR 12.2 07/01/2025    K 3.6 07/01/2025    CO2 22.8 07/01/2025    CALCIUM 9.4 07/01/2025    ALBUMIN 4.4 07/01/2025    AST 27 07/01/2025    ALT 37 07/01/2025     Lab Results - Last 18 Months   Lab Units 12/06/24  0454 12/05/24  1942 12/05/24  1243 12/04/24  0433 12/03/24 1948   INR   --   --   --   --  1.16*   APTT seconds 71.4* 142.0* 55.7*   < > 29.0    < > = values in this interval not displayed.     Lab Results   Component Value Date    PTT 71.4 (C) 12/06/2024    INR 1.16 (H) 12/03/2024     Assessment & Plan     Assessment & Plan  Pulmonary embolism?  There has been doubt about it.  The CT scan reported questionable embolism.  The VQ scan was entirely normal.  Despite this he had concerning  symptoms and an elevated D-dimer.  On this basis longer anticoagulation is reasonable.  Continue same dose.  Factor V Leiden variant heterozygosity.   Reviewed recent records.  Reviewed laboratory exams and discussed with him.  Follow Dr. Coleman in 3 months, sooner if condition indicates.    Electronically signed by Priscila Ahumada PA-C

## 2025-07-11 ENCOUNTER — OFFICE VISIT (OUTPATIENT)
Dept: ONCOLOGY | Facility: CLINIC | Age: 50
End: 2025-07-11
Payer: COMMERCIAL

## 2025-07-11 ENCOUNTER — LAB (OUTPATIENT)
Dept: LAB | Facility: HOSPITAL | Age: 50
End: 2025-07-11
Payer: COMMERCIAL

## 2025-07-11 VITALS
WEIGHT: 225 LBS | DIASTOLIC BLOOD PRESSURE: 87 MMHG | SYSTOLIC BLOOD PRESSURE: 144 MMHG | HEIGHT: 67 IN | TEMPERATURE: 98.1 F | HEART RATE: 66 BPM | OXYGEN SATURATION: 100 % | BODY MASS INDEX: 35.31 KG/M2

## 2025-07-11 DIAGNOSIS — I26.99 ACUTE PULMONARY EMBOLISM, UNSPECIFIED PULMONARY EMBOLISM TYPE, UNSPECIFIED WHETHER ACUTE COR PULMONALE PRESENT: Primary | ICD-10-CM

## 2025-07-11 DIAGNOSIS — I26.99 PULMONARY EMBOLISM, UNSPECIFIED CHRONICITY, UNSPECIFIED PULMONARY EMBOLISM TYPE, UNSPECIFIED WHETHER ACUTE COR PULMONALE PRESENT: ICD-10-CM

## 2025-07-11 DIAGNOSIS — I26.99 PULMONARY EMBOLISM, UNSPECIFIED CHRONICITY, UNSPECIFIED PULMONARY EMBOLISM TYPE, UNSPECIFIED WHETHER ACUTE COR PULMONALE PRESENT: Primary | ICD-10-CM

## 2025-07-11 LAB
ALBUMIN SERPL-MCNC: 4.6 G/DL (ref 3.5–5.2)
ALBUMIN/GLOB SERPL: 1.8 G/DL
ALP SERPL-CCNC: 87 U/L (ref 39–117)
ALT SERPL W P-5'-P-CCNC: 41 U/L (ref 1–41)
ANION GAP SERPL CALCULATED.3IONS-SCNC: 9.3 MMOL/L (ref 5–15)
AST SERPL-CCNC: 27 U/L (ref 1–40)
BASOPHILS # BLD AUTO: 0.02 10*3/MM3 (ref 0–0.2)
BASOPHILS NFR BLD AUTO: 0.4 % (ref 0–1.5)
BILIRUB SERPL-MCNC: 0.6 MG/DL (ref 0–1.2)
BUN SERPL-MCNC: 13.7 MG/DL (ref 6–20)
BUN/CREAT SERPL: 13.7 (ref 7–25)
CALCIUM SPEC-SCNC: 9.6 MG/DL (ref 8.6–10.5)
CHLORIDE SERPL-SCNC: 100 MMOL/L (ref 98–107)
CO2 SERPL-SCNC: 26.7 MMOL/L (ref 22–29)
CREAT SERPL-MCNC: 1 MG/DL (ref 0.76–1.27)
DEPRECATED RDW RBC AUTO: 39.8 FL (ref 37–54)
EGFRCR SERPLBLD CKD-EPI 2021: 91.1 ML/MIN/1.73
EOSINOPHIL # BLD AUTO: 0.11 10*3/MM3 (ref 0–0.4)
EOSINOPHIL NFR BLD AUTO: 2.1 % (ref 0.3–6.2)
ERYTHROCYTE [DISTWIDTH] IN BLOOD BY AUTOMATED COUNT: 12.9 % (ref 12.3–15.4)
GLOBULIN UR ELPH-MCNC: 2.6 GM/DL
GLUCOSE SERPL-MCNC: 101 MG/DL (ref 65–99)
HCT VFR BLD AUTO: 46.3 % (ref 37.5–51)
HGB BLD-MCNC: 15.8 G/DL (ref 13–17.7)
HOLD SPECIMEN: NORMAL
IMM GRANULOCYTES # BLD AUTO: NORMAL 10*3/UL
IMM GRANULOCYTES NFR BLD AUTO: NORMAL %
LYMPHOCYTES # BLD AUTO: 1.48 10*3/MM3 (ref 0.7–3.1)
LYMPHOCYTES NFR BLD AUTO: 28.9 % (ref 19.6–45.3)
MCH RBC QN AUTO: 29 PG (ref 26.6–33)
MCHC RBC AUTO-ENTMCNC: 34.1 G/DL (ref 31.5–35.7)
MCV RBC AUTO: 85 FL (ref 79–97)
MONOCYTES # BLD AUTO: 0.53 10*3/MM3 (ref 0.1–0.9)
MONOCYTES NFR BLD AUTO: 10.4 % (ref 5–12)
NEUTROPHILS NFR BLD AUTO: 2.98 10*3/MM3 (ref 1.7–7)
NEUTROPHILS NFR BLD AUTO: 58.2 % (ref 42.7–76)
PLATELET # BLD AUTO: 197 10*3/MM3 (ref 140–450)
PMV BLD AUTO: 10.6 FL (ref 6–12)
POTASSIUM SERPL-SCNC: 3.7 MMOL/L (ref 3.5–5.2)
PROT SERPL-MCNC: 7.2 G/DL (ref 6–8.5)
RBC # BLD AUTO: 5.45 10*6/MM3 (ref 4.14–5.8)
SODIUM SERPL-SCNC: 136 MMOL/L (ref 136–145)
WBC NRBC COR # BLD AUTO: 5.12 10*3/MM3 (ref 3.4–10.8)
WHOLE BLOOD HOLD COAG: NORMAL

## 2025-07-11 PROCEDURE — 80053 COMPREHEN METABOLIC PANEL: CPT | Performed by: INTERNAL MEDICINE

## 2025-07-11 PROCEDURE — 36415 COLL VENOUS BLD VENIPUNCTURE: CPT

## 2025-07-11 PROCEDURE — 85025 COMPLETE CBC W/AUTO DIFF WBC: CPT
